# Patient Record
Sex: MALE | Race: BLACK OR AFRICAN AMERICAN | Employment: FULL TIME | URBAN - METROPOLITAN AREA
[De-identification: names, ages, dates, MRNs, and addresses within clinical notes are randomized per-mention and may not be internally consistent; named-entity substitution may affect disease eponyms.]

---

## 2020-03-13 NOTE — PROGRESS NOTES
Valarie Dumont  : 1975  Primary: Richelle Iqbal [employer]  Secondary:  [address] at [facility]  [address], [address] [facility],[address], [facility] [address].  [phone]   [phone]         OUTPATIENT OCCUPATIONAL THERAPY: Daily Treatment Note 3/16/2020  Visit Count:  1    ICD-10: Treatment Diagnosis:   Muscle weakness (generalized) (M62.81)  Stiffness of left hand, not elsewhere classified (M25.642)  TREATMENT PLAN:  Effective Dates: 3/16/2020 TO 2020 (90 days). Frequency/Duration: 1-2x/week for 90 Day(s)  Precautions/Allergies:   Patient has no allergy information on record. Pre-treatment Symptoms/Complaints:  Pt states, \"I just have random pain and numbness shooting through my finger. \"   Pain: Initial: Pain Intensity 1: 0 /10 Post Session:  0/10   Medications Last Reviewed:  3/16/2020    Updated Objective Findings:  See evaluation note from today   TREATMENT:     Therapeutic Exercise: (  10 minutes):  Exercises per grid below to improve mobility and strength. Required minimal visual and verbal cues to promote proper body posture and promote proper body mechanics. Progressed resistance, range, repetitions and complexity of movement as indicated. Date:  3/16/2020 Date:   Date:     Activity/Exercise Parameters Parameters Parameters   AROM  X 5 DIP flexion     DIP Blocking Exercises X 5      Resistive Putty Tip pinch x 5 with yellow theraputty. HEP Issued DIP flexion blocking exercises and tip pinch strengthening exercises                             [facility]  Treatment/Session Summary:  Pt issued HEP including tip pinch exercises with yellow theraputty and DIP blocking. Pt encouraged to d/c exercises should there be any noted increase in pain; pt verbalized understanding. · Response to Treatment:  no adverse reaction.   · Communication/Consultation:  OT role and plan of care  · Equipment provided today:  yellow theraputty  · Recommendations/Intent for next treatment session: Next visit will focus on advancement towards more challenging exercises and reduction in the amount of assistance provided. \"    Total Treatment Billable Duration:  10 minutes   OT Patient Time In/Time Out  Time In: 4084  Time Out: [address], OT    Future Appointments   Date Time Provider Eric Hurtado   3/27/2020 11:00 AM Radha Oliveira OT [facility] DIANE   4/3/2020 10:15 AM Radha Oliveira OT DOUG CONTRERAS   4/10/2020 11:00 AM Radha Oliveira OT SFEORPT BHARATHIE   4/17/2020 11:00 AM Radha Oliveira OT BHARATHIEORPCAMILLE GARVINE

## 2020-03-13 NOTE — THERAPY EVALUATION
Francesca Wright  : 1975  Primary: Puja Decker  Secondary:  [address] at [place] [id number] [facility], [address] [place] [address],[address], [facility] [address].  [phone]   [phone]           OUTPATIENT OCCUPATIONAL THERAPY:Initial Assessment 3/16/2020   ICD-10: Treatment Diagnosis:   Muscle weakness (generalized) (M62.81)  Stiffness of left hand, not elsewhere classified (H65.276)  Precautions/Allergies:   Patient has no allergy information on record. TREATMENT PLAN:  Effective Dates: 3/16/2020 TO 2020 (90 days). Frequency/Duration: 1-2x/week for 90 Day(s) MEDICAL/REFERRING DIAGNOSIS:  Displaced fracture of distal phalanx of unspecified finger, initial encounter for open fracture [S62.639B]   DATE OF ONSET:   REFERRING PHYSICIAN: Bradford Braswell MD MD Orders: eval and treat  Return MD Appointment: ~ 2 weeks ago. INITIAL ASSESSMENT:  Mr. Memo Remy presents for displaced fracture of L DIP s/p laceration to DIP in work incident. Upon evaluation, pt presents with decreased DIP flexion AROM as well as limited tip/3JC pinch and  strength. At this time, Francesca Wright would benefit from skilled OT services to maximize functional use of L hand during ADLs, IADLs, and work tasks. PROBLEM LIST (Impacting functional limitations):  1. Decreased Strength  2. Decreased ADL/Functional Activities  3. Increased Pain  4. Decreased Activity Tolerance  5. Decreased Flexibility/Joint Mobility  6. Decreased [place] with Home Exercise Program INTERVENTIONS PLANNED: (Treatment may consist of any combination of the following)  1. Activities of daily living training  2. Adaptive equipment training  3. Donning&doffing training  4. Manual therapy training  5. Modalities  6. Neuromuscular re-eduation  7. Re-evaluation  8. Splinting  9. Therapeutic activity  10.  Therapeutic exercise     GOALS: (Goals have been discussed and agreed upon with patient.)  Short-Term Functional Goals: Time Frame: 45 days  1. Patient will be independent with HEP within 2 visits of initial evaluation in order to maintain gains achieved during therapy. Discharge Goals: Time Frame: 90 days  1. Patient will report increased independence with activities of daily living, evidenced by a score of < 30 on the Quick Dash. 2. Patient will demonstrate improved left  strength by 10 lbs to increase independence with completing heavy household chores. 3. Patient will improve left DIP flexion AROM by 20 degrees for increased ability to complete feeding tasks. 4. Patient will improve L tip pinch by 5 degrees to for increased ability to open tight or new jars. OUTCOME MEASURE:   Tool Used: Disabilities of the Arm, Shoulder and Hand (DASH) Questionnaire - Quick Version  Score:  Initial: 46/55  Most Recent: X/55 (Date: -- )   Interpretation of Score: The DASH is designed to measure the activities of daily living in person's with upper extremity dysfunction or pain. Each section is scored on a 1-5 scale, 5 representing the greatest disability. The scores of each section are added together for a total score of 55. MEDICAL NECESSITY:   · Patient demonstrates good rehab potential due to higher previous functional level. REASON FOR SERVICES/OTHER COMMENTS:  · Patient continues to require skilled intervention due to medical complications and patient unable to attend/participate in therapy as expected. Total Duration:  OT Patient Time In/Time Out  Time In: 1025  Time Out: 1105    Rehabilitation Potential For Stated Goals: Good  Regarding Car Montoya's therapy, I certify that the treatment plan above will be carried out by a therapist or under their direction. Thank you for this referral,  Fidelina Chavez, OT     Referring Physician Signature: Raymundo Babcock MD No Signature is Required for this note.      PAIN/SUBJECTIVE:   Initial: Pain Intensity 1: 0  /10 Post Session:  0/10   OCCUPATIONAL PROFILE & HISTORY: History of Injury/Illness (Reason for Referral): Work incident resulting in deep laceration to the bone of his L index DIP resulting in limited DIP flexion as well as decreased  and pinch strength. Reports random occurrence of numbness, pain and swelling. Notes pain to be 10/10 with activity. Past Medical History/Comorbidities:   Mr. Bk Cade  has no past medical history on file. Mr. Bk Cade  has no past surgical history on file. Social History/Living Environment:      Prior Level of Function/Work/Activity:  Independent with ADLs, IADLs, and work tasks. Dominant Side:         RIGHT  Personal Factors:          Sex:  male        Age:  45 y.o. Ambulatory/Rehab Services H2 Model Falls Risk Assessment   Risk Factors:       (1)  Gender [Male] Ability to Rise from Chair:       (0)  Ability to rise in a single movement   Falls Prevention Plan:       No modifications necessary   Total: (5 or greater = High Risk): 0   ©2010 [facility] [employer]. All Rights Reserved. [facility] Patent #[id number]. Federal Law prohibits the replication, distribution or use without written permission from [facility] [employer]   Current Medications:     No current outpatient medications on file.    Date Last Reviewed:  3/16/2020     Complexity Level: Brief history (0):  LOW COMPLEXITY   ASSESSMENT OF OCCUPATIONAL PERFORMANCE:   ROM:    L MP: WFL  L PIP flexion: 73 degrees  L DIP flexion: 0 degrees    R DIP flexion: 75 degrees    Strength:    L : 40, 30, 40  R : 77, 80, 85    L tip: 1 lbs   L 3JC: 3 lbs    R tip: 12  R 3JC: 14  Activities of Daily Living:           Basic ADLs (From Assessment) Complex ADLs (From Assessment)   Basic ADL  Feeding: Modified independent  Oral Facial Hygiene/Grooming: Modified Independent  Bathing: Modified independent  Upper Body Dressing: Modified independent  Lower Body Dressing: Modified independent  Toileting: Modified independent Instrumental ADL  Meal Preparation: Modified independent  Homemaking: Modified independent   Grooming/Bathing/Dressing Activities of Daily Living                                    Physical Skills Involved:  1. Range of Motion  2. Strength  3. Activity Tolerance  4. Fine Motor Control Cognitive Skills Affected (resulting in the inability to perform in a timely and safe manner): 1. none Psychosocial Skills Affected:  1. Habits/Routines  2. Environmental Adaptation   Number of elements that affect the Plan of Care[de-identified] 5+:  HIGH COMPLEXITY   CLINICAL DECISION MAKING:   Clinical Decision-Making Assessment: This case is of low clinical decision making due to the pt's ROM, strength and pain being the main limiting factors to occupational performance.      Assessment process, impact of co-morbidities, assessment modification\need for assistance, and selection of interventions: Analytical Complexity:LOW COMPLEXITY

## 2020-03-16 ENCOUNTER — HOSPITAL ENCOUNTER (OUTPATIENT)
Dept: PHYSICAL THERAPY | Age: 45
Discharge: HOME | End: 2020-03-16
Payer: COMMERCIAL

## 2020-03-16 PROCEDURE — 97165 OT EVAL LOW COMPLEX 30 MIN: CPT

## 2020-03-16 PROCEDURE — 97110 THERAPEUTIC EXERCISES: CPT

## 2020-06-08 ENCOUNTER — HOSPITAL ENCOUNTER (OUTPATIENT)
Dept: PHYSICAL THERAPY | Age: 45
Discharge: HOME | End: 2020-06-08
Payer: COMMERCIAL

## 2020-06-08 PROCEDURE — 97110 THERAPEUTIC EXERCISES: CPT

## 2020-06-08 NOTE — PROGRESS NOTES
Anant Costa  : 1975  Primary: Mauricio Kelley [employer]  Secondary:  [address] at [place] [address] [facility], [address] [place] [address],[address], [address] [facility]  [phone]   [phone]         OUTPATIENT OCCUPATIONAL THERAPY: Daily Treatment Note 2020  Visit Count:  2    ICD-10: Treatment Diagnosis:   Muscle weakness (generalized) (M62.81)  Stiffness of left hand, not elsewhere classified (M25.642)  TREATMENT PLAN:  Effective Dates: 3/16/2020 TO 2020 (90 days). Frequency/Duration: 1-2x/week for 90 Day(s)  Precautions/Allergies:   Patient has no allergy information on record. Pre-treatment Symptoms/Complaints:  Pt states, \"I have been doing the exercises that Robert De Souza gave me when I came in before you closed for COVID. \"   Pain: Initial: Pain Intensity 1: 6  Pain Location 1: Finger (comment which one)(left index)  Pain Orientation 1: Distal /10 Post Session:  0/10   Medications Last Reviewed:  2020    Updated Objective Findings:  See evaluation note from today   TREATMENT:     Therapeutic Exercise: (  45 minutes):  Exercises per grid below to improve mobility and strength. Required minimal visual and verbal cues to promote proper body posture and promote proper body mechanics. Progressed resistance, range, repetitions and complexity of movement as indicated. Date:  3/16/2020 Date:  20 Date:     Activity/Exercise Parameters Parameters Parameters   AROM  X 5 DIP flexion Full fist 2 x 10  Hook fist 2 x 10    In fluidotherapy x 15 minutes    DIP Blocking Exercises X 5  2 x 10    Resistive Putty Tip pinch x 5 with yellow theraputty.      HEP Issued DIP flexion blocking exercises and tip pinch strengthening exercises     Hand helper  2 x 10  20 pounds      Resistive clothespin  Red x 30 sponge pieces    Digiflex  Index only  Yellow   2 x 10    PROM  2 x 10 with hold at end range of DIP flexion                            [employer] Portal  Treatment/Session Summary:  Pt tolerating therapeutic exercise without complaint; demonstrated independence with HEP since plan of care was interrupted by COVID-19 pandemic. · Response to Treatment:  no adverse reaction. · Communication/Consultation:  OT role and plan of care  · Equipment provided today:  None today  · Recommendations/Intent for next treatment session: Next visit will focus on advancement towards more challenging exercises and reduction in the amount of assistance provided. \"    Total Treatment Billable Duration:  45 minutes   OT Patient Time In/Time Out  Time In: 2770  Time Out: [address] [facility],     Future Appointments   Date Time Provider Eric Hurtado   6/15/2020  1:00 PM Ryne Morin OT [facility] DIANE   6/22/2020  2:30 PM JESSICA Green IRIS   6/29/2020  1:00 PM Ryne Morin OT [facility] DIANE

## 2020-06-15 ENCOUNTER — HOSPITAL ENCOUNTER (OUTPATIENT)
Dept: PHYSICAL THERAPY | Age: 45
Discharge: HOME | End: 2020-06-15
Payer: COMMERCIAL

## 2020-06-15 NOTE — PROGRESS NOTES
Hugo Pierce  : 1975  Primary: Echo Garcia [employer]  Secondary:  [address]  at [place] [id number] [facility], [address] [place] [address],[address], [facility] [address]  [phone]   [phone]       Patient cancelled his appointment today due to a family emergency. Will continue with plan of care at next visit.      Carmeal Fernando OT

## 2020-06-17 ENCOUNTER — HOSPITAL ENCOUNTER (OUTPATIENT)
Dept: PHYSICAL THERAPY | Age: 45
Discharge: HOME | End: 2020-06-17
Payer: COMMERCIAL

## 2020-06-17 PROCEDURE — 97110 THERAPEUTIC EXERCISES: CPT

## 2020-06-17 NOTE — THERAPY RECERTIFICATION
Husam Nicholas : 1975 Primary: Erika Levels Secondary:  [facility]rapy [facility] at [address], [address], [place] [address]. [phone]   [phone] OUTPATIENT OCCUPATIONAL THERAPY:Recertification  ICD-10: Treatment Diagnosis: Muscle weakness (generalized) (M62.81) Stiffness of left hand, not elsewhere classified (M25.642) Precautions/Allergies:  
Patient has no allergy information on record. TREATMENT PLAN: 
Effective Dates:  2020 to 2020. Frequency/Duration: 1-2x/week for 90 Day(s) MEDICAL/REFERRING DIAGNOSIS: 
Displaced fracture of distal phalanx of unspecified finger, initial encounter for open fracture [S62.639B] DATE OF ONSET:  REFERRING PHYSICIAN: Anabell Bobby MD MD Orders: eval and treat Return MD Appointment: 20.  
20: Mr. Montoya's care was interrupted by the [place] pandemic. He has completed 3 occupational therapy visits and is making good progress toward his goals. His DIP range of motion has improved significantly since evaluation. He continues to complain of sharp, shooting intermittent pain in the left index finger. Noted shaking of hand during therapeutic exercise. Feel he may continue to benefit from skilled occupational therapy to maximize functional use of left hand in activities of daily living. INITIAL ASSESSMENT:  Mr. Leo Cedeno presents for displaced fracture of L DIP s/p laceration to DIP in work incident. Upon evaluation, pt presents with decreased DIP flexion AROM as well as limited tip/3JC pinch and  strength. At this time, Husam Hirramya would benefit from skilled OT services to maximize functional use of L hand during ADLs, IADLs, and work tasks. PROBLEM LIST (Impacting functional limitations): 1. Decreased Strength 2. Decreased ADL/Functional Activities 3. Increased Pain 4. Decreased Activity Tolerance 5. Decreased Flexibility/Joint Mobility 6. Decreased [place] with Home Exercise Program INTERVENTIONS PLANNED: (Treatment may consist of any combination of the following) 1. Activities of daily living training 2. Adaptive equipment training 3. Donning&doffing training 4. Manual therapy training 5. Modalities 6. Neuromuscular re-eduation 7. Re-evaluation 8. Splinting 9. Therapeutic activity 10. Therapeutic exercise GOALS: (Goals have been discussed and agreed upon with patient.) Short-Term Functional Goals: Time Frame: 45 days 1. Patient will be independent with HEP within 2 visits of initial evaluation in order to maintain gains achieved during therapy. Met Discharge Goals: Time Frame: 90 days 1. Patient will report increased independence with activities of daily living, evidenced by a score of < 30 on the Quick Dash. Continue to address 2. Patient will demonstrate improved left  strength by 10 lbs to increase independence with completing heavy household chores. 3. Patient will improve left DIP flexion AROM by 20 degrees for increased ability to complete feeding tasks. 4. Patient will improve L tip pinch by 5 degrees to for increased ability to open tight or new jars. OUTCOME MEASURE:  
Tool Used: Disabilities of the Arm, Shoulder and Hand (DASH) Questionnaire - Quick Version Score:  Initial: 46/55  Most Recent: X/55 (Date: -- ) Interpretation of Score: The DASH is designed to measure the activities of daily living in person's with upper extremity dysfunction or pain. Each section is scored on a 1-5 scale, 5 representing the greatest disability. The scores of each section are added together for a total score of 55. MEDICAL NECESSITY:  
· Patient demonstrates good rehab potential due to higher previous functional level. REASON FOR SERVICES/OTHER COMMENTS: 
· Patient continues to require skilled intervention due to medical complications and patient unable to attend/participate in therapy as expected. Total Duration: OT Patient Time In/Time Out Time In: 9982 Time Out: 1105 Rehabilitation Potential For Stated Goals: Good Regarding Lenore Montoya's therapy, I certify that the treatment plan above will be carried out by a therapist or under their direction. Thank you for this referral, 
Kala Diane, JESSICA Referring Physician Signature: Ankit Dejesus MD _______________________________ Date _____________ PAIN/SUBJECTIVE:  
Initial: Pain Intensity 1: 7 Pain Location 1: Finger (comment which one)(index left) Pain Orientation 1: Distal  /10 Post Session:  0/10 OCCUPATIONAL PROFILE & HISTORY:  
History of Injury/Illness (Reason for Referral): Work incident resulting in deep laceration to the bone of his L index DIP resulting in limited DIP flexion as well as decreased  and pinch strength. Reports random occurrence of numbness, pain and swelling. Notes pain to be 10/10 with activity. Past Medical History/Comorbidities:  
Mr. Latanya Meléndez  has no past medical history on file. Mr. Latanya Meléndez  has no past surgical history on file. Social History/Living Environment:  
  
Prior Level of Function/Work/Activity: 
Independent with ADLs, IADLs, and work tasks. Dominant Side:  
      RIGHT Personal Factors:   
      Sex:  male Age:  39 y.o. Ambulatory/Rehab Services H2 Model Falls Risk Assessment Risk Factors: 
     (1)  Gender [Male] Ability to Rise from Chair: 
     (0)  Ability to rise in a single movement Falls Prevention Plan: No modifications necessary Total: (5 or greater = High Risk): 0  
©2010 [facility] [place] [address] [place] [address] States Patent #[id number]. Federal Law prohibits the replication, distribution or use without written permission from [facility] [employer] Current Medications: No current outpatient medications on file. Date Last Reviewed:  6/17/2020 Complexity Level: Brief history (0):  LOW COMPLEXITY ASSESSMENT OF OCCUPATIONAL PERFORMANCE:  
ROM:   
L MP: WFL 
L PIP flexion: 73 degrees; 6/17/20=75 degrees L DIP flexion: 0 degrees' 6/17/20=45 degrees R DIP flexion: 75 degrees Strength:   
L : 40, 30, 40; 6/17/20=41 pounds R : 77, 80, 85; 6/17/20=119 pounds L tip: 1 lbs;  6/17/20=7 L 3JC: 3 lbs;  6/17/20=12 R tip: 12;  6/17/20=13 R 3JC: 14;  6/17/20=22 Activities of Daily Living:  
       
Basic ADLs (From Assessment) Complex ADLs (From Assessment) Grooming/Bathing/Dressing Activities of Daily Living

## 2020-06-17 NOTE — PROGRESS NOTES
Prasanna Paiz  : 1975  Primary: Jodie Loera [employer]  Secondary:  [address] at [address], [address] [place] [address],[address], [facility] [facility]  [phone]   [phone]         OUTPATIENT OCCUPATIONAL THERAPY: Daily Treatment Note 2020  Visit Count:  3    ICD-10: Treatment Diagnosis:   Muscle weakness (generalized) (M62.81)  Stiffness of left hand, not elsewhere classified (M25.642)  TREATMENT PLAN:  Effective Dates: 3/16/2020 TO 2020 (90 days). Frequency/Duration: 1-2x/week for 90 Day(s)  Precautions/Allergies:   Patient has no allergy information on record. Pre-treatment Symptoms/Complaints:  Pt states, \"I keep doing my exercises, but those sharp pains are still really bad. \"   Pain: Initial: Pain Intensity 1: 7  Pain Location 1: Finger (comment which one)(index left)  Pain Orientation 1: Distal /10 Post Session:  0/10   Medications Last Reviewed:  2020    Updated Objective Findings:  See recert note from today   TREATMENT:     Therapeutic Exercise: (  40 minutes):  Exercises per grid below to improve mobility and strength. Required minimal visual and verbal cues to promote proper body posture and promote proper body mechanics. Progressed resistance, range, repetitions and complexity of movement as indicated. Date:  3/16/2020 Date:  20 Date:  20   Activity/Exercise Parameters Parameters Parameters   AROM  X 5 DIP flexion Full fist 2 x 10  Hook fist 2 x 10    In fluidotherapy x 15 minutes Full fist 2 x 10  Hook fist 2 x 10    In fluidotherapy x 15 minutes   DIP Blocking Exercises X 5  2 x 10 2 x 10   Resistive Putty Tip pinch x 5 with yellow theraputty.      HEP Issued DIP flexion blocking exercises and tip pinch strengthening exercises     Hand helper  2 x 10  20 pounds 2 x 10  25 pounds     Resistive clothespin  Red x 30 sponge pieces Green x 30 sponge pieces   Digiflex  Index only  Yellow   2 x 10 Index only  Yellow   2 x 10   PROM  2 x 10 with hold at end range of DIP flexion 2 x 10 with hold at end range of DIP flexion                           [employer] Portal  Treatment/Session Summary:  Pt tolerating therapeutic exercise without complaint; able to tolerate increased resistance. Ice massage x 3 minutes at end of session to decrease pain after activity. · Response to Treatment:  no adverse reaction. · Communication/Consultation:  OT role and plan of care  · Equipment provided today:  None today  · Recommendations/Intent for next treatment session: Next visit will focus on advancement towards more challenging exercises and reduction in the amount of assistance provided. \"    Total Treatment Billable Duration:  40 minutes   OT Patient Time In/Time Out  Time In: 6153  Time Out: [address] [place] [address] [address], OT    Future Appointments   Date Time Provider Eric Hurtado   6/22/2020  2:30 PM Meghna Menjivar OT [facility] DIANE   6/29/2020  1:00 PM Meghna Menjivar OT [facility] BHARATHIE

## 2020-06-22 ENCOUNTER — HOSPITAL ENCOUNTER (OUTPATIENT)
Dept: PHYSICAL THERAPY | Age: 45
Discharge: HOME | End: 2020-06-22
Payer: COMMERCIAL

## 2020-06-22 PROCEDURE — 97140 MANUAL THERAPY 1/> REGIONS: CPT

## 2020-06-22 PROCEDURE — 97110 THERAPEUTIC EXERCISES: CPT

## 2020-06-22 NOTE — PROGRESS NOTES
Morales Yostgilda  : 1975  Primary: [facility]  Secondary:  [address] at [address] [facility]  [address], [address], [address] [facility]  [phone]   [phone]         OUTPATIENT OCCUPATIONAL THERAPY: Daily Treatment Note 2020  Visit Count:  4    ICD-10: Treatment Diagnosis:   Muscle weakness (generalized) (M62.81)  Stiffness of left hand, not elsewhere classified (T49.414)  Precautions/Allergies:   Patient has no allergy information on record. TREATMENT PLAN:  Effective Dates:  2020 to 2020. Frequency/Duration: 1-2x/week for 90 Day(s)    Pre-treatment Symptoms/Complaints:  Pt states, \"I saw the doctor, and he wants me to stay out of work until .\"   Pain: Initial: Pain Intensity 1: 8  Pain Location 1: Finger (comment which one)(Left index)  Pain Orientation 1: Distal /10 Post Session:  5/10   Medications Last Reviewed:  2020    Updated Objective Findings:  None Today   TREATMENT:     Therapeutic Exercise: (  45 minutes):  Exercises per grid below to improve mobility and strength. Required minimal visual and verbal cues to promote proper body posture and promote proper body mechanics. Progressed resistance, range, repetitions and complexity of movement as indicated. Date:  3/16/2020 Date:  20 Date:  20 Date:  20   Activity/Exercise Parameters Parameters Parameters    AROM  X 5 DIP flexion Full fist 2 x 10  Hook fist 2 x 10    In fluidotherapy x 15 minutes Full fist 2 x 10  Hook fist 2 x 10    In fluidotherapy x 15 minutes Full fist 2 x 10  Hook fist 2 x 10  Flat fist 2 x 10    In fluidotherapy x 15 minutes   DIP Blocking Exercises X 5  2 x 10 2 x 10 1 x 10   Resistive Putty Tip pinch x 5 with yellow theraputty.       HEP Issued DIP flexion blocking exercises and tip pinch strengthening exercises      Hand helper  2 x 10  20 pounds 2 x 10  25 pounds 2 x 15  25 pounds     Resistive clothespin  Red x 30 sponge pieces Green x 30 sponge pieces Green x 30 sponge pieces   Digiflex  Index only  Yellow   2 x 10 Index only  Yellow   2 x 10 Index only and full fist  Yellow   2 x 10 each   PROM  2 x 10 with hold at end range of DIP flexion 2 x 10 with hold at end range of DIP flexion 2 x 10 with hold at end range of DIP flexion                            Manual Therapy: (10 minutes): Soft tissue mobilization was utilized and necessary because of the patient's restricted motion of soft tissue. Retrograde massage of left index finger, hand and forearm to decrease edema and increase functional range of motion. Treatment/Session Summary:  Pt tolerating therapeutic exercise without complaint. Pain went from 8/10 upon arrival to 5/10 at end of session. · Response to Treatment:  no adverse reaction. · Communication/Consultation:  OT role and plan of care  · Equipment provided today:  None today  · Recommendations/Intent for next treatment session: Next visit will focus on advancement towards more challenging exercises and reduction in the amount of assistance provided. \"    Total Treatment Billable Duration:  55 minutes   OT Patient Time In/Time Out  Time In: 1440  Time Out: Raisa Lane, OT    Future Appointments   Date Time Provider Eric Hurtado   6/29/2020  1:00 PM Brooklynn Cooper OT [facility]

## 2020-06-29 ENCOUNTER — HOSPITAL ENCOUNTER (OUTPATIENT)
Dept: PHYSICAL THERAPY | Age: 45
Discharge: HOME | End: 2020-06-29
Payer: COMMERCIAL

## 2020-06-29 PROCEDURE — 97110 THERAPEUTIC EXERCISES: CPT

## 2020-06-29 PROCEDURE — 97140 MANUAL THERAPY 1/> REGIONS: CPT

## 2020-06-29 NOTE — PROGRESS NOTES
Henrik Prajapati  : 1975  Primary: Cyn Bower  Secondary:  [address]  at [place] [id number] [facility], [address] [place] [address],[address], [place] [address].  [phone]   [phone]         OUTPATIENT OCCUPATIONAL THERAPY: Daily Treatment Note 2020  Visit Count:  5    ICD-10: Treatment Diagnosis:   Muscle weakness (generalized) (M62.81)  Stiffness of left hand, not elsewhere classified (L22.129)  Precautions/Allergies:   Patient has no allergy information on record. TREATMENT PLAN:  Effective Dates:  2020 to 2020. Frequency/Duration: 1-2x/week for 90 Day(s)    Pre-treatment Symptoms/Complaints:  Pt states, \"My finger is definitely feeling better than it was last week. \"   Pain: Initial: Pain Intensity 1: 5  Pain Location 1: Finger (comment which one)(Left index)  Pain Orientation 1: Distal /10 Post Session:  4/10   Medications Last Reviewed:  2020    Updated Objective Findings:  None Today   TREATMENT:     Therapeutic Exercise: (  35 minutes):  Exercises per grid below to improve mobility and strength. Required minimal visual and verbal cues to promote proper body posture and promote proper body mechanics. Progressed resistance, range, repetitions and complexity of movement as indicated. Date:  3/16/2020 Date:  20 Date:  20 Date:  20 Date:  20   Activity/Exercise Parameters Parameters Parameters     AROM  X 5 DIP flexion Full fist 2 x 10  Hook fist 2 x 10    In fluidotherapy x 15 minutes Full fist 2 x 10  Hook fist 2 x 10    In fluidotherapy x 15 minutes Full fist 2 x 10  Hook fist 2 x 10  Flat fist 2 x 10    In fluidotherapy x 15 minutes Full fist 2 x 10  Hook fist 2 x 10  Flat fist 1 x 10    In fluidotherapy x 15 minutes   DIP Blocking Exercises X 5  2 x 10 2 x 10 1 x 10    Resistive Putty Tip pinch x 5 with yellow theraputty.        HEP Issued DIP flexion blocking exercises and tip pinch strengthening exercises       Hand helper  2 x 10  20 pounds 2 x 10  25 pounds 2 x 15  25 pounds 2 x 10  25 pounds     Resistive clothespin  Red x 30 sponge pieces Green x 30 sponge pieces Green x 30 sponge pieces Blue x 30 sponge pieces   Digiflex  Index only  Yellow   2 x 10 Index only  Yellow   2 x 10 Index only and full fist  Yellow   2 x 10 each Index only and full fist  Yellow   2 x 10 each   PROM  2 x 10 with hold at end range of DIP flexion 2 x 10 with hold at end range of DIP flexion 2 x 10 with hold at end range of DIP flexion 1 x 15 with hold at end range of DIP flexion                               Manual Therapy: (10 minutes): Soft tissue mobilization was utilized and necessary because of the patient's restricted motion of soft tissue. Retrograde massage of left index finger, hand and forearm to decrease edema and increase functional range of motion. Treatment/Session Summary:  Pt tolerating gentle progressive range of motion and strengthening without complaint. Pain went from 5/10 upon arrival to 4/10 at end of session. Pain report overall since last session improved from 8 to 5/10. · Response to Treatment:  no adverse reaction. · Communication/Consultation:  OT role and plan of care  · Equipment provided today:  None today  · Recommendations/Intent for next treatment session: Next visit will focus on advancement towards more challenging exercises and reduction in the amount of assistance provided. \"    Total Treatment Billable Duration:  45 minutes   OT Patient Time In/Time Out  Time In: 1300  Time Out: [place], OT    Future Appointments   Date Time Provider Eric Hurtado   7/13/2020  1:00 PM Sugar Run Fitting, OT [facility]   7/20/2020  1:00 PM [place] Fitting, OT [facility]   7/27/2020  1:00 PM [place] Fitting, OT [facility]

## 2020-07-13 ENCOUNTER — HOSPITAL ENCOUNTER (OUTPATIENT)
Dept: PHYSICAL THERAPY | Age: 45
Discharge: HOME | End: 2020-07-13
Payer: COMMERCIAL

## 2020-07-13 PROCEDURE — 97140 MANUAL THERAPY 1/> REGIONS: CPT

## 2020-07-13 PROCEDURE — 97110 THERAPEUTIC EXERCISES: CPT

## 2020-07-13 NOTE — PROGRESS NOTES
Henrik Prajapati  : 1975  Primary: Cyn Chavezkamilah  Secondary:  [address]  at [address], [address] [place] [address],[address], [place] [address].  [phone]   [phone]         OUTPATIENT OCCUPATIONAL THERAPY: Daily Treatment Note 2020  Visit Count:  6    ICD-10: Treatment Diagnosis:   Muscle weakness (generalized) (M62.81)  Stiffness of left hand, not elsewhere classified (R98.652)  Precautions/Allergies:   Patient has no allergy information on record. TREATMENT PLAN:  Effective Dates:  2020 to 2020. Frequency/Duration: 1-2x/week for 90 Day(s)    Pre-treatment Symptoms/Complaints:  Pt states, \"My pain is a little better. \"   Pain: Initial: Pain Intensity 1: 4  Pain Location 1: Finger (comment which one)(left index)  Pain Orientation 1: Distal /10 Post Session:  2/10   Medications Last Reviewed:  2020    Updated Objective Findings:  None Today   TREATMENT:     Therapeutic Exercise: (  35 minutes):  Exercises per grid below to improve mobility and strength. Required minimal visual and verbal cues to promote proper body posture and promote proper body mechanics. Progressed resistance, range, repetitions and complexity of movement as indicated. Date:  3/16/2020 Date:  20 Date:  20 Date:  20 Date:  20   Activity/Exercise Parameters Parameters Parameters      AROM  X 5 DIP flexion Full fist 2 x 10  Hook fist 2 x 10    In fluidotherapy x 15 minutes Full fist 2 x 10  Hook fist 2 x 10    In fluidotherapy x 15 minutes Full fist 2 x 10  Hook fist 2 x 10  Flat fist 2 x 10    In fluidotherapy x 15 minutes Full fist 2 x 10  Hook fist 2 x 10  Flat fist 1 x 10    In fluidotherapy x 15 minutes Full fist 2 x 10  Hook fist 2 x 10  Flat fist 1 x 10    In fluidotherapy x 15 minutes   DIP Blocking Exercises X 5  2 x 10 2 x 10 1 x 10  1 x 10   Resistive Putty Tip pinch x 5 with yellow theraputty.         HEP Issued DIP flexion blocking exercises and tip pinch strengthening exercises        Hand helper  2 x 10  20 pounds 2 x 10  25 pounds 2 x 15  25 pounds 2 x 10  25 pounds 2 x 10  25 pounds     Resistive clothespin  Red x 30 sponge pieces Green x 30 sponge pieces Green x 30 sponge pieces Blue x 30 sponge pieces Blue x 30 sponge pieces   Digiflex  Index only  Yellow   2 x 10 Index only  Yellow   2 x 10 Index only and full fist  Yellow   2 x 10 each Index only and full fist  Yellow   2 x 10 each Index only and full fist  Yellow   2 x 10 each   PROM  2 x 10 with hold at end range of DIP flexion 2 x 10 with hold at end range of DIP flexion 2 x 10 with hold at end range of DIP flexion 1 x 15 with hold at end range of DIP flexion 1 x 15 with hold at end range of DIP flexion                                  Manual Therapy: (10 minutes): Soft tissue mobilization was utilized and necessary because of the patient's restricted motion of soft tissue. Retrograde massage of left index finger, hand and forearm to decrease edema and increase functional range of motion. Treatment/Session Summary:  Pt tolerating gentle progressive range of motion and strengthening without complaint. Pain went from 4/10 upon arrival to 2/10 at end of session. Pain report overall since last session improved from 5 to 4/10. · Response to Treatment:  no adverse reaction. · Communication/Consultation:  OT role and plan of care  · Equipment provided today:  None today  · Recommendations/Intent for next treatment session: Next visit will focus on advancement towards more challenging exercises and reduction in the amount of assistance provided. \"    Total Treatment Billable Duration:  45 minutes   OT Patient Time In/Time Out  Time In: 1300  Time Out: [address], OT    Future Appointments   Date Time Provider Eric Hurtado   7/20/2020  1:00 PM Markus Hager OT [facility]   7/27/2020  1:00 PM Markus Hager OT [facility]

## 2020-07-20 ENCOUNTER — HOSPITAL ENCOUNTER (OUTPATIENT)
Dept: PHYSICAL THERAPY | Age: 45
Discharge: HOME | End: 2020-07-20
Payer: COMMERCIAL

## 2020-07-20 PROCEDURE — 97110 THERAPEUTIC EXERCISES: CPT

## 2020-07-20 NOTE — PROGRESS NOTES
Abilio Margarito  : 1975  Primary: Leticia [facility]  Secondary:  [address] at [place] [id number] [facility], [address] [place] [address],[address], [facility] [address].  [phone]   [phone]         OUTPATIENT OCCUPATIONAL THERAPY: Daily Treatment Note 2020  Visit Count:  7    ICD-10: Treatment Diagnosis:   Muscle weakness (generalized) (M62.81)  Stiffness of left hand, not elsewhere classified (I16.751)  Precautions/Allergies:   Patient has no allergy information on record. TREATMENT PLAN:  Effective Dates:  2020 to 2020. Frequency/Duration: 1-2x/week for 90 Day(s)    Pre-treatment Symptoms/Complaints:  Pt states, \"My finger is still sore and numb. I have started back to work a couple of days a week at [employer]. \"   Pain: Initial: Pain Intensity 1: 4  Pain Location 1: Finger (comment which one)(left index)  Pain Orientation 1: Distal /10 Post Session:  2/10   Medications Last Reviewed:  2020    Updated Objective Findings:  None Today   TREATMENT:     Therapeutic Exercise: (  40 minutes):  Exercises per grid below to improve mobility and strength. Required minimal visual and verbal cues to promote proper body posture and promote proper body mechanics. Progressed resistance, range, repetitions and complexity of movement as indicated.      Date:  20 Date:  20 Date:  20 Date:  20 Date:  20   Activity/Exercise Parameters Parameters       AROM  Full fist 2 x 10  Hook fist 2 x 10    In fluidotherapy x 15 minutes Full fist 2 x 10  Hook fist 2 x 10    In fluidotherapy x 15 minutes Full fist 2 x 10  Hook fist 2 x 10  Flat fist 2 x 10    In fluidotherapy x 15 minutes Full fist 2 x 10  Hook fist 2 x 10  Flat fist 1 x 10    In fluidotherapy x 15 minutes Full fist 2 x 10  Hook fist 2 x 10  Flat fist 1 x 10    In fluidotherapy x 15 minutes Full fist 2 x 10  Hook fist 2 x 10  Flat fist 1 x 10    In fluidotherapy x 15 minutes   DIP Blocking Exercises 2 x 10 2 x 10 1 x 10  1 x 10 2 x 10   Resistive Putty         HEP         Hand helper 2 x 10  20 pounds 2 x 10  25 pounds 2 x 15  25 pounds 2 x 10  25 pounds 2 x 10  25 pounds 2 x 10  25 pounds     Resistive clothespin Red x 30 sponge pieces Green x 30 sponge pieces Green x 30 sponge pieces Blue x 30 sponge pieces Blue x 30 sponge pieces Blue x 30 sponge pieces   Digiflex Index only  Yellow   2 x 10 Index only  Yellow   2 x 10 Index only and full fist  Yellow   2 x 10 each Index only and full fist  Yellow   2 x 10 each Index only and full fist  Yellow   2 x 10 each Index only and full fist  Yellow   2 x 10 each   PROM 2 x 10 with hold at end range of DIP flexion 2 x 10 with hold at end range of DIP flexion 2 x 10 with hold at end range of DIP flexion 1 x 15 with hold at end range of DIP flexion 1 x 15 with hold at end range of DIP flexion 2 x 10 with hold at end range of DIP flexion                                  Manual Therapy: (5 minutes): Soft tissue mobilization was utilized and necessary because of the patient's restricted motion of soft tissue. Retrograde massage of left index finger, hand and forearm to decrease edema and increase functional range of motion. Treatment/Session Summary:  Pt tolerating gentle progressive range of motion and strengthening. Pain went from 4/10 upon arrival to 2/10 at end of session. Pain report overall since last session stayed a 4/10. · Response to Treatment:  no adverse reaction. · Communication/Consultation:  OT role and plan of care  · Equipment provided today:  None today  · Recommendations/Intent for next treatment session: Next visit will focus on advancement towards more challenging exercises and reduction in the amount of assistance provided. \"    Total Treatment Billable Duration:  45 minutes   OT Patient Time In/Time Out  Time In: 1300  Time Out: [place], OT    Future Appointments   Date Time Provider Eric Hurtado   7/27/2020  1:00 PM Joe Estimable D, OT [facility]

## 2020-07-27 ENCOUNTER — HOSPITAL ENCOUNTER (OUTPATIENT)
Dept: PHYSICAL THERAPY | Age: 45
Discharge: HOME | End: 2020-07-27
Payer: COMMERCIAL

## 2020-07-27 NOTE — PROGRESS NOTES
Tawanna Sanchez  : 1975  Primary: Marina O[facility]  Secondary:  [address]  at [place] [id number] [facility], [address] [place] [address],[address], [place] [address].  [phone]   [phone]       Patient cancelled today's appointment due to illness.      Seth Garcia, OT

## 2020-08-04 ENCOUNTER — APPOINTMENT (OUTPATIENT)
Dept: PHYSICAL THERAPY | Age: 45
End: 2020-08-04
Payer: OTHER MISCELLANEOUS

## 2020-08-10 ENCOUNTER — HOSPITAL ENCOUNTER (OUTPATIENT)
Dept: PHYSICAL THERAPY | Age: 45
Discharge: HOME | End: 2020-08-10
Payer: OTHER MISCELLANEOUS

## 2020-08-10 PROCEDURE — 97110 THERAPEUTIC EXERCISES: CPT

## 2020-08-10 PROCEDURE — 97140 MANUAL THERAPY 1/> REGIONS: CPT

## 2020-08-10 NOTE — PROGRESS NOTES
Hugo Pelayo  : 1975  Primary: Echo Radha [facility]  Secondary:  [address] at [address] [facility]  [address], [address], [address] [facility]  [phone]   [phone]         OUTPATIENT OCCUPATIONAL THERAPY: Daily Treatment Note 8/10/2020  Visit Count:  8    ICD-10: Treatment Diagnosis:   Muscle weakness (generalized) (M62.81)  Stiffness of left hand, not elsewhere classified (H98.902)  Precautions/Allergies:   Patient has no allergy information on record. TREATMENT PLAN:  Effective Dates:  2020 to 2020. Frequency/Duration: 1-2x/week for 90 Day(s)    Pre-treatment Symptoms/Complaints:  Pt states, \"I am doing 4 hour shifts at [employer]. \"   Pain: Initial: Pain Intensity 1: 4  Pain Location 1: Finger (comment which one)(Left index)  Pain Orientation 1: Distal /10 Post Session:  2/10   Medications Last Reviewed:  8/10/2020    Updated Objective Findings:  None Today   TREATMENT:     Therapeutic Exercise: (  35 minutes):  Exercises per grid below to improve mobility and strength. Required minimal visual and verbal cues to promote proper body posture and promote proper body mechanics. Progressed resistance, range, repetitions and complexity of movement as indicated.      Date:  20 Date:  20 Date:  20 Date:  20 Date:  8/10/20   Activity/Exercise Parameters        AROM  Full fist 2 x 10  Hook fist 2 x 10    In fluidotherapy x 15 minutes Full fist 2 x 10  Hook fist 2 x 10  Flat fist 2 x 10    In fluidotherapy x 15 minutes Full fist 2 x 10  Hook fist 2 x 10  Flat fist 1 x 10    In fluidotherapy x 15 minutes Full fist 2 x 10  Hook fist 2 x 10  Flat fist 1 x 10    In fluidotherapy x 15 minutes Full fist 2 x 10  Hook fist 2 x 10  Flat fist 1 x 10    In fluidotherapy x 15 minutes Full fist 2 x 10  Hook fist 2 x 10  Flat fist 1 x 10    In fluidotherapy x 10 minutes   DIP Blocking Exercises 2 x 10 1 x 10  1 x 10 2 x 10 2 x 10   Resistive Putty         HEP Hand helper 2 x 10  25 pounds 2 x 15  25 pounds 2 x 10  25 pounds 2 x 10  25 pounds 2 x 10  25 pounds 2 x 15  25 pounds     Resistive clothespin Green x 30 sponge pieces Green x 30 sponge pieces Blue x 30 sponge pieces Blue x 30 sponge pieces Blue x 30 sponge pieces Blue x 30 sponge pieces   Digiflex Index only  Yellow   2 x 10 Index only and full fist  Yellow   2 x 10 each Index only and full fist  Yellow   2 x 10 each Index only and full fist  Yellow   2 x 10 each Index only and full fist  Yellow   2 x 10 each Index only   Yellow   2 x 10  and full fist green 2 x 10   PROM 2 x 10 with hold at end range of DIP flexion 2 x 10 with hold at end range of DIP flexion 1 x 15 with hold at end range of DIP flexion 1 x 15 with hold at end range of DIP flexion 2 x 10 with hold at end range of DIP flexion 2 x 10 with hold at end range of DIP flexion                                  Manual Therapy: (10 minutes): Soft tissue mobilization was utilized and necessary because of the patient's restricted motion of soft tissue. Retrograde massage of left index finger, hand and forearm to decrease edema and increase functional range of motion. Treatment/Session Summary:  Pt tolerating gentle progressive range of motion and strengthening. Pain went from 4/10 upon arrival to 2/10 at end of session. Pain report overall since last session stayed a 3-4/10. · Response to Treatment:  no adverse reaction. · Communication/Consultation:  OT role and plan of care  · Equipment provided today:  None today  · Recommendations/Intent for next treatment session: Next visit will focus on advancement towards more challenging exercises and reduction in the amount of assistance provided. \"    Total Treatment Billable Duration:  45 minutes   OT Patient Time In/Time Out  Time In: 1310  Time Out: [address] [facility], OT    Future Appointments   Date Time Provider Eric Genesis   8/17/2020  1:45 PM Yesenia Gunderson OT [facility] 8/24/2020  1:45 PM JESSICA Connor   8/31/2020  1:45 PM JESSICA Connor

## 2020-08-19 ENCOUNTER — HOSPITAL ENCOUNTER (OUTPATIENT)
Dept: PHYSICAL THERAPY | Age: 45
Discharge: HOME | End: 2020-08-19
Payer: OTHER MISCELLANEOUS

## 2020-08-19 PROCEDURE — 97110 THERAPEUTIC EXERCISES: CPT

## 2020-08-19 PROCEDURE — 97140 MANUAL THERAPY 1/> REGIONS: CPT

## 2020-08-19 NOTE — PROGRESS NOTES
Satish Brittaney  : 1975  Primary: Aaron Dotyrojelio [facility]  Secondary:  [address]  at [place] [address],[address] [facility], [place] [address].  [phone]   [phone]         OUTPATIENT OCCUPATIONAL THERAPY: Daily Treatment Note 2020  Visit Count:  9    ICD-10: Treatment Diagnosis:   Muscle weakness (generalized) (M62.81)  Stiffness of left hand, not elsewhere classified (L71.413)  Precautions/Allergies:   Patient has no allergy information on record. TREATMENT PLAN:  Effective Dates:  2020 to 2020. Frequency/Duration: 1-2x/week for 90 Day(s)    Pre-treatment Symptoms/Complaints:  Pt states, \"I am sorry I missed Monday; I was trying to get my daughter's school figured out. \"   Pain: Initial: Pain Intensity 1: 3  Pain Location 1: Finger (comment which one)(left index)  Pain Orientation 1: Distal /10 Post Session:  2/10   Medications Last Reviewed:  2020    Updated Objective Findings:  None Today   TREATMENT:     Therapeutic Exercise: (  30 minutes):  Exercises per grid below to improve mobility and strength. Required minimal visual and verbal cues to promote proper body posture and promote proper body mechanics. Progressed resistance, range, repetitions and complexity of movement as indicated.      Date:  20 Date:  20 Date:  20 Date:  8/10/20 Date:  20   Activity/Exercise         AROM  Full fist 2 x 10  Hook fist 2 x 10  Flat fist 2 x 10    In fluidotherapy x 15 minutes Full fist 2 x 10  Hook fist 2 x 10  Flat fist 1 x 10    In fluidotherapy x 15 minutes Full fist 2 x 10  Hook fist 2 x 10  Flat fist 1 x 10    In fluidotherapy x 15 minutes Full fist 2 x 10  Hook fist 2 x 10  Flat fist 1 x 10    In fluidotherapy x 15 minutes Full fist 2 x 10  Hook fist 2 x 10  Flat fist 1 x 10    In fluidotherapy x 10 minutes Full fist 2 x 10  Hook fist 2 x 10  Flat fist 1 x 10    In fluidotherapy x 10 minutes   DIP Blocking Exercises 1 x 10  1 x 10 2 x 10 2 x 10 2 x 10   Resistive Putty         HEP         Hand helper 2 x 15  25 pounds 2 x 10  25 pounds 2 x 10  25 pounds 2 x 10  25 pounds 2 x 15  25 pounds 2 x 15  30 pounds     Resistive clothespin Green x 30 sponge pieces Blue x 30 sponge pieces Blue x 30 sponge pieces Blue x 30 sponge pieces Blue x 30 sponge pieces Blue x 30 sponge pieces   Digiflex Index only and full fist  Yellow   2 x 10 each Index only and full fist  Yellow   2 x 10 each Index only and full fist  Yellow   2 x 10 each Index only and full fist  Yellow   2 x 10 each Index only   Yellow   2 x 10  and full fist green 2 x 10 Index only   Yellow   2 x 10  and full fist green 2 x 10   PROM 2 x 10 with hold at end range of DIP flexion 1 x 15 with hold at end range of DIP flexion 1 x 15 with hold at end range of DIP flexion 2 x 10 with hold at end range of DIP flexion 2 x 10 with hold at end range of DIP flexion 2 x 10 with hold at end range of DIP flexion                                  Manual Therapy: (10 minutes): Soft tissue mobilization was utilized and necessary because of the patient's restricted motion of soft tissue. Retrograde massage of left index finger, hand and forearm to decrease edema and increase functional range of motion. Treatment/Session Summary:  Pt tolerating continued gentle progressive range of motion and strengthening. Pain went from 3/10 upon arrival to 2/10 at end of session. Pain report overall since last session stayed a 3/10. Patient also complains of tingling and shaking in left index finger and hand at rest and with use. · Response to Treatment:  no adverse reaction. · Communication/Consultation:  OT role and plan of care  · Equipment provided today:  None today  · Recommendations/Intent for next treatment session: Next visit will focus on advancement towards more challenging exercises and reduction in the amount of assistance provided. \"    Total Treatment Billable Duration:  40 minutes   OT Patient Time In/Time Out  Time In: 0940  Time Out: [address],     Future Appointments   Date Time Provider Eric Hurtado   8/24/2020  1:45 PM Michael Galindo OT [facility]   8/31/2020  1:45 PM Michael Galindo OT [facility]

## 2020-08-24 ENCOUNTER — HOSPITAL ENCOUNTER (OUTPATIENT)
Dept: PHYSICAL THERAPY | Age: 45
Discharge: HOME | End: 2020-08-24
Payer: OTHER MISCELLANEOUS

## 2020-08-24 PROCEDURE — 97110 THERAPEUTIC EXERCISES: CPT

## 2020-08-24 PROCEDURE — 97140 MANUAL THERAPY 1/> REGIONS: CPT

## 2020-08-24 NOTE — PROGRESS NOTES
Sherren Silva  : 1975  Primary: Lev Plant [employer]  Secondary:  [address]  at [facility]  [address], [address], [facility] [address].  [phone]   [phone]         OUTPATIENT OCCUPATIONAL THERAPY: Daily Treatment Note 2020  Visit Count:  10    ICD-10: Treatment Diagnosis:   Muscle weakness (generalized) (M62.81)  Stiffness of left hand, not elsewhere classified (T24.575)  Precautions/Allergies:   Patient has no allergy information on record. TREATMENT PLAN:  Effective Dates:  2020 to 2020. Frequency/Duration: 1-2x/week for 90 Day(s)    Pre-treatment Symptoms/Complaints:  Pt states, \"My pain is about the same. \"   Pain: Initial: Pain Intensity 1: 3  Pain Location 1: Finger (comment which one)(left index)  Pain Orientation 1: Distal /10 Post Session:  2/10   Medications Last Reviewed:  2020    Updated Objective Findings:  see below:   ROM:    L MP: WFL  L PIP flexion: 73 degrees; 20=75 degrees; 20=91  L DIP flexion: 0 degrees' 20=45 degrees; 20=60     R DIP flexion: 75 degrees     Strength:    L : 40, 30, 40; 20=41 pounds; 20=58 pounds  R : 77, 80, 85; 20=119 pounds; 20=119 pounds     L tip: 1 lbs;  20=7; 20=8 pounds  L 3JC: 3 lbs;  20=12; 20=15 pounds     R tip: 12;  20=13; 20=17 pounds  R 3JC: 14;  20=22; 20=19 pounds   TREATMENT:     Therapeutic Exercise: (  30 minutes):  Exercises per grid below to improve mobility and strength. Required minimal visual and verbal cues to promote proper body posture and promote proper body mechanics. Progressed resistance, range, repetitions and complexity of movement as indicated.      Date:  20 Date:  20 Date:  8/10/20 Date:  20 Date:  20   Activity/Exercise         AROM  Full fist 2 x 10  Hook fist 2 x 10  Flat fist 1 x 10    In fluidotherapy x 15 minutes Full fist 2 x 10  Hook fist 2 x 10  Flat fist 1 x 10    In fluidotherapy x 15 minutes Full fist 2 x 10  Hook fist 2 x 10  Flat fist 1 x 10    In fluidotherapy x 15 minutes Full fist 2 x 10  Hook fist 2 x 10  Flat fist 1 x 10    In fluidotherapy x 10 minutes Full fist 2 x 10  Hook fist 2 x 10  Flat fist 1 x 10    In fluidotherapy x 10 minutes In fluidotherapy x 10 minutes   DIP Blocking Exercises  1 x 10 2 x 10 2 x 10 2 x 10 2 x 10   Resistive Putty      Green  Full fist x 15 reps    Pinch x 20 reps   HEP         Hand helper 2 x 10  25 pounds 2 x 10  25 pounds 2 x 10  25 pounds 2 x 15  25 pounds 2 x 15  30 pounds 2 x 15  30 pounds     Resistive clothespin Blue x 30 sponge pieces Blue x 30 sponge pieces Blue x 30 sponge pieces Blue x 30 sponge pieces Blue x 30 sponge pieces Blue x 30 sponge pieces   Digiflex Index only and full fist  Yellow   2 x 10 each Index only and full fist  Yellow   2 x 10 each Index only and full fist  Yellow   2 x 10 each Index only   Yellow   2 x 10  and full fist green 2 x 10 Index only   Yellow   2 x 10  and full fist green 2 x 10 Index only   Yellow   2 x 10  and full fist green 2 x 10   PROM 1 x 15 with hold at end range of DIP flexion 1 x 15 with hold at end range of DIP flexion 2 x 10 with hold at end range of DIP flexion 2 x 10 with hold at end range of DIP flexion 2 x 10 with hold at end range of DIP flexion 2 x 10 with hold at end range of DIP flexion                                  Manual Therapy: (10 minutes): Soft tissue mobilization was utilized and necessary because of the patient's restricted motion of soft tissue. Retrograde massage of left index finger, hand and forearm to decrease edema and increase functional range of motion. Treatment/Session Summary:  Pt tolerating continued gentle progressive range of motion and strengthening; noted improvements in range of motion and  and pinch measurements today. Pain went from 3/10 upon arrival to 2/10 at end of session.  Pain report overall since last session stayed a 3/10. Patient also complains of tingling and shaking in left index finger and hand at rest and with use. · Response to Treatment:  no adverse reaction. · Communication/Consultation:  OT role and plan of care  · Equipment provided today:  Green theraputty  · Recommendations/Intent for next treatment session: Next visit will focus on advancement towards more challenging exercises and reduction in the amount of assistance provided. \"    Total Treatment Billable Duration:  40 minutes   OT Patient Time In/Time Out  Time In: 0706  Time Out: [address] [facility],     Future Appointments   Date Time Provider Eric Hurtado   8/31/2020  1:45 PM Steffanie Isaacs OT [facility]

## 2020-08-31 ENCOUNTER — HOSPITAL ENCOUNTER (OUTPATIENT)
Dept: PHYSICAL THERAPY | Age: 45
Discharge: HOME | End: 2020-08-31
Payer: OTHER MISCELLANEOUS

## 2020-08-31 NOTE — PROGRESS NOTES
Jared Gracia  : 1975  Primary: Kim Deckerriskamilah  Secondary:  [address]  at [place] [id number] [facility], [address] [place] [address],[address], [facility] 91.  [phone]   [phone]       Mr. Nazanin Cadet cancelled today's appointment and rescheduled for Wednesday.      Dl Hidalgo, OT

## 2020-09-02 ENCOUNTER — HOSPITAL ENCOUNTER (OUTPATIENT)
Dept: PHYSICAL THERAPY | Age: 45
Discharge: HOME | End: 2020-09-02
Payer: COMMERCIAL

## 2020-09-02 PROCEDURE — 97110 THERAPEUTIC EXERCISES: CPT

## 2020-09-02 PROCEDURE — 97140 MANUAL THERAPY 1/> REGIONS: CPT

## 2020-09-02 NOTE — PROGRESS NOTES
Dina Amador  : 1975  Primary: Edmond Bower  Secondary:  [address]  at [address] [facility]  [facility], [address] [place] [address],[address], [place] [address].  [phone]   [phone]         OUTPATIENT OCCUPATIONAL THERAPY: Daily Treatment Note 2020  Visit Count:  11    ICD-10: Treatment Diagnosis:   Muscle weakness (generalized) (M62.81)  Stiffness of left hand, not elsewhere classified (K05.843)  Precautions/Allergies:   Patient has no allergy information on record. TREATMENT PLAN:  Effective Dates:  2020 to 2020. Frequency/Duration: 1-2x/week for 90 Day(s)    Pre-treatment Symptoms/Complaints:  Pt states, \"My pain is about the same. The doctor said for me to keep going with another 12 therapy visits. He said he would not write me back into work until my hand stopped shaking. \"   Pain: Initial: Pain Intensity 1: 3  Pain Location 1: Finger (comment which one)(Left index)  Pain Orientation 1: Distal /10 Post Session:  2/10   Medications Last Reviewed:  2020    Updated Objective Findings:  see below:   ROM:    L MP: WFL  L PIP flexion: 73 degrees; 20=75 degrees; 20=91  L DIP flexion: 0 degrees' 20=45 degrees; 20=60     R DIP flexion: 75 degrees     Strength:    L : 40, 30, 40; 20=41 pounds; 20=58 pounds  R : 77, 80, 85; 20=119 pounds; 20=119 pounds     L tip: 1 lbs;  20=7; 20=8 pounds  L 3JC: 3 lbs;  20=12; 20=15 pounds     R tip: 12;  20=13; 20=17 pounds  R 3JC: 14;  20=22; 20=19 pounds   TREATMENT:     Therapeutic Exercise: (  35 minutes):  Exercises per grid below to improve mobility and strength. Required minimal visual and verbal cues to promote proper body posture and promote proper body mechanics. Progressed resistance, range, repetitions and complexity of movement as indicated.      Date:  20 Date:  20 Date:  8/10/20 Date:  20 Date:  20 Date:  20 Activity/Exercise          AROM  Full fist 2 x 10  Hook fist 2 x 10  Flat fist 1 x 10    In fluidotherapy x 15 minutes Full fist 2 x 10  Hook fist 2 x 10  Flat fist 1 x 10    In fluidotherapy x 15 minutes Full fist 2 x 10  Hook fist 2 x 10  Flat fist 1 x 10    In fluidotherapy x 15 minutes Full fist 2 x 10  Hook fist 2 x 10  Flat fist 1 x 10    In fluidotherapy x 10 minutes Full fist 2 x 10  Hook fist 2 x 10  Flat fist 1 x 10    In fluidotherapy x 10 minutes In fluidotherapy x 10 minutes Full fist 2 x 10  Hook fist 2 x 10  Flat fist 1 x 10    In fluidotherapy x 10 minutes   DIP Blocking Exercises  1 x 10 2 x 10 2 x 10 2 x 10 2 x 10    Resistive Putty      Green  Full fist x 15 reps    Pinch x 20 reps    HEP          Hand helper 2 x 10  25 pounds 2 x 10  25 pounds 2 x 10  25 pounds 2 x 15  25 pounds 2 x 15  30 pounds 2 x 15  30 pounds 2 x 15  35 pounds     Resistive clothespin Blue x 30 sponge pieces Blue x 30 sponge pieces Blue x 30 sponge pieces Blue x 30 sponge pieces Blue x 30 sponge pieces Blue x 30 sponge pieces Black x 30 sponge pieces   Digiflex Index only and full fist  Yellow   2 x 10 each Index only and full fist  Yellow   2 x 10 each Index only and full fist  Yellow   2 x 10 each Index only   Yellow   2 x 10  and full fist green 2 x 10 Index only   Yellow   2 x 10  and full fist green 2 x 10 Index only   Yellow   2 x 10  and full fist green 2 x 10 Index only   Yellow   2 x 10  and full fist green 2 x 10   PROM 1 x 15 with hold at end range of DIP flexion 1 x 15 with hold at end range of DIP flexion 2 x 10 with hold at end range of DIP flexion 2 x 10 with hold at end range of DIP flexion 2 x 10 with hold at end range of DIP flexion 2 x 10 with hold at end range of DIP flexion 2 x 10 with hold at end range of DIP flexion                                     Manual Therapy: (10 minutes): Soft tissue mobilization was utilized and necessary because of the patient's restricted motion of soft tissue.  Retrograde massage of left index finger, hand and forearm to decrease edema and increase functional range of motion. Treatment/Session Summary:  Pt tolerating continued gentle progressive range of motion and strengthening; noted improvements in range of motion and  and pinch measurements today. Pain went from 3/10 upon arrival to 2/10 at end of session. Pain report overall since last session stayed a 3/10. Patient also complains of tingling and shaking in left index finger and hand at rest and with use. Tolerated an increase in resistance with  and pinch today. · Response to Treatment:  no adverse reaction. · Communication/Consultation:  OT role and plan of care  · Equipment provided today:  Green theraputty  · Recommendations/Intent for next treatment session: Next visit will focus on advancement towards more challenging exercises and reduction in the amount of assistance provided. \"    Total Treatment Billable Duration:  45 minutes   OT Patient Time In/Time Out  Time In: 9008  Time Out: 736 [facility],     Future Appointments   Date Time Provider Eric Huitronisti   9/14/2020 11:00 AM JESSICA Haq   9/21/2020  1:00 PM JESSICA Haq   9/28/2020  1:00 PM Tessa Pantoja OT [facility] DIANE

## 2020-09-21 ENCOUNTER — HOSPITAL ENCOUNTER (OUTPATIENT)
Dept: PHYSICAL THERAPY | Age: 45
Discharge: HOME | End: 2020-09-21
Payer: COMMERCIAL

## 2020-09-21 PROCEDURE — 97140 MANUAL THERAPY 1/> REGIONS: CPT

## 2020-09-21 PROCEDURE — 97110 THERAPEUTIC EXERCISES: CPT

## 2020-09-21 NOTE — THERAPY RECERTIFICATION
Dl García : 1975 Primary: Lamount Mantle Secondary:  [facility] [address], [address], [place] [address]. [phone]   [phone] OUTPATIENT OCCUPATIONAL THERAPY:Recertification  ICD-10: Treatment Diagnosis: Muscle weakness (generalized) (M62.81) Stiffness of left hand, not elsewhere classified (M25.642) Precautions/Allergies:  
Patient has no allergy information on record. TREATMENT PLAN: 
Effective Dates: 2020 to 2020. Frequency/Duration: 1-2x/week for 90 Day(s) MEDICAL/REFERRING DIAGNOSIS: 
Displaced fracture of distal phalanx of unspecified finger, initial encounter for open fracture [S62.639B] DATE OF ONSET:  REFERRING PHYSICIAN: Shun Humphrey MD MD Orders: eval and treat Return MD Appointment: Unknown. 20: Mr. Jonh Alves is making good progress toward his goals with improving range of motion and strength of the left upper extremity. He continues to complain of shakiness and shooting, tingling pain in the left index finger. Feel he may continue to benefit from skilled occupational therapy to maximize functional use of left hand in activities of daily living.  
 
20: Mr. Montoya's care was interrupted by the [place] pandemic. He has completed 3 occupational therapy visits and is making good progress toward his goals. His DIP range of motion has improved significantly since evaluation. He continues to complain of sharp, shooting intermittent pain in the left index finger. Noted shaking of hand during therapeutic exercise. Feel he may continue to benefit from skilled occupational therapy to maximize functional use of left hand in activities of daily living. INITIAL ASSESSMENT:  Mr. Jonh Alves presents for displaced fracture of L DIP s/p laceration to DIP in work incident.  Upon evaluation, pt presents with decreased DIP flexion AROM as well as limited tip/3JC pinch and  strength. At this time, [place] would benefit from skilled OT services to maximize functional use of L hand during ADLs, IADLs, and work tasks. PROBLEM LIST (Impacting functional limitations): 1. Decreased Strength 2. Decreased ADL/Functional Activities 3. Increased Pain 4. Decreased Activity Tolerance 5. Decreased Flexibility/Joint Mobility 6. Decreased [place] with Home Exercise Program INTERVENTIONS PLANNED: (Treatment may consist of any combination of the following) 1. Activities of daily living training 2. Adaptive equipment training 3. Donning&doffing training 4. Manual therapy training 5. Modalities 6. Neuromuscular re-eduation 7. Re-evaluation 8. Splinting 9. Therapeutic activity 10. Therapeutic exercise GOALS: (Goals have been discussed and agreed upon with patient.) Short-Term Functional Goals: Time Frame: 45 days 1. Patient will be independent with HEP within 2 visits of initial evaluation in order to maintain gains achieved during therapy. Met Discharge Goals: Time Frame: 90 days 1. Patient will report increased independence with activities of daily living, evidenced by a score of < 30 on the Quick Dash. Continue to address 2. Patient will demonstrate improved left  strength by 10 lbs to increase independence with completing heavy household chores. Met 3. Patient will improve left DIP flexion AROM by 20 degrees for increased ability to complete feeding tasks. Continue to address 4. Patient will improve L tip pinch by 5 degrees to for increased ability to open tight or new jars. Continue to address OUTCOME MEASURE:  
Tool Used: Disabilities of the Arm, Shoulder and Hand (DASH) Questionnaire - Quick Version Score:  Initial: 46/55  Most Recent: X/55 (Date: -- ) Interpretation of Score:  The DASH is designed to measure the activities of daily living in person's with upper extremity dysfunction or pain. Each section is scored on a 1-5 scale, 5 representing the greatest disability. The scores of each section are added together for a total score of 55. MEDICAL NECESSITY:  
· Patient demonstrates good rehab potential due to higher previous functional level. REASON FOR SERVICES/OTHER COMMENTS: 
· Patient continues to require skilled intervention due to medical complications and patient unable to attend/participate in therapy as expected. Total Duration: OT Patient Time In/Time Out Time In: 1300 Time Out: 5036 Rehabilitation Potential For Stated Goals: Good Regarding Shelby Montoya's therapy, I certify that the treatment plan above will be carried out by a therapist or under their direction. Thank you for this referral, 
Amber Charles OT Referring Physician Signature: Attila Sheridan MD _______________________________ Date _____________ PAIN/SUBJECTIVE:  
Initial: Pain Intensity 1: 3 Pain Location 1: Finger (comment which one)(Left index) Pain Orientation 1: Distal  /10 Post Session:  0/10 OCCUPATIONAL PROFILE & HISTORY:  
History of Injury/Illness (Reason for Referral): Work incident resulting in deep laceration to the bone of his L index DIP resulting in limited DIP flexion as well as decreased  and pinch strength. Reports random occurrence of numbness, pain and swelling. Notes pain to be 10/10 with activity. Past Medical History/Comorbidities:  
Mr. Mary Herrmann  has no past medical history on file. Mr. Mary Herrmann  has no past surgical history on file. Social History/Living Environment:  
  
Prior Level of Function/Work/Activity: 
Independent with ADLs, IADLs, and work tasks. Dominant Side:  
      RIGHT Personal Factors:   
      Sex:  male Age:  39 y.o. Ambulatory/Rehab Services H2 Model Falls Risk Assessment Risk Factors: 
     (1)  Gender [Male] Ability to Rise from Chair: (0)  Ability to rise in a single movement Falls Prevention Plan: No modifications necessary Total: (5 or greater = High Risk): 0  
©2010 [facility] [employer] States Patent #[id number]. Federal Law prohibits the replication, distribution or use without written permission from [facility] [place] Current Medications: No current outpatient medications on file. Date Last Reviewed:  9/21/2020 Complexity Level: Brief history (0):  LOW COMPLEXITY ASSESSMENT OF OCCUPATIONAL PERFORMANCE:  
ROM:   
L MP: WFL 
L PIP flexion: 73 degrees; 6/17/20=75 degrees; 8/24/20=91 L DIP flexion: 0 degrees' 6/17/20=45 degrees; 8/24/20=60 
  
R DIP flexion: 75 degrees 
  
Strength:   
L : 40, 30, 40; 6/17/20=41 pounds; 8/24/20=58 pounds R : 77, 80, 85; 6/17/20=119 pounds; 8/24/20=119 pounds 
  
L tip: 1 lbs;  6/17/20=7; 8/24/20=8 pounds L 3JC: 3 lbs;  6/17/20=12; 8/24/20=15 pounds 
  
R tip: 12;  6/17/20=13; 8/24/20=17 pounds R 3JC: 14;  6/17/20=22; 8/24/20=19 pounds Activities of Daily Living:  
       
Basic ADLs (From Assessment) Complex ADLs (From Assessment) Grooming/Bathing/Dressing Activities of Daily Living

## 2020-09-21 NOTE — PROGRESS NOTES
Sherren Silva  : 1975  Primary: Lev Plant [employer]  Secondary:  [address]  at [facility]  [address], [address], [address] [facility]  [phone]   [phone]         OUTPATIENT OCCUPATIONAL THERAPY: Daily Treatment Note 2020  Visit Count:  12    ICD-10: Treatment Diagnosis:   Muscle weakness (generalized) (M62.81)  Stiffness of left hand, not elsewhere classified (F60.193)  Precautions/Allergies:   Patient has no allergy information on record. TREATMENT PLAN:  Effective Dates: 2020 to 2020. Frequency/Duration: 1-2x/week for 90 Day(s)    Pre-treatment Symptoms/Complaints:  Pt states, \"My pain is about the same; no better no worse. \"   Pain: Initial: Pain Intensity 1: 3  Pain Location 1: Finger (comment which one)(Left index)  Pain Orientation 1: Distal 10 Post Session:  2/10   Medications Last Reviewed:  2020    Updated Objective Findings:  see below:   ROM:    L MP: WFL  L PIP flexion: 73 degrees; 20=75 degrees; 20=91  L DIP flexion: 0 degrees' 20=45 degrees; 20=60     R DIP flexion: 75 degrees     Strength:    L : 40, 30, 40; 20=41 pounds; 20=58 pounds  R : 77, 80, 85; 20=119 pounds; 20=119 pounds     L tip: 1 lbs;  20=7; 20=8 pounds  L 3JC: 3 lbs;  20=12; 20=15 pounds     R tip: 12;  20=13; 20=17 pounds  R 3JC: 14;  20=22; 20=19 pounds   TREATMENT:     Therapeutic Exercise: (  35 minutes):  Exercises per grid below to improve mobility and strength. Required minimal visual and verbal cues to promote proper body posture and promote proper body mechanics. Progressed resistance, range, repetitions and complexity of movement as indicated.      20 Date:  20 Date:  8/10/20 Date:  20 Date:  20 Date:  20   Activity/Exercise         AROM  Full fist 2 x 10  Hook fist 2 x 10  Flat fist 1 x 10    In fluidotherapy x 15 minutes Full fist 2 x 10  Hook fist 2 x 10  Flat fist 1 x 10    In fluidotherapy x 15 minutes Full fist 2 x 10  Hook fist 2 x 10  Flat fist 1 x 10    In fluidotherapy x 10 minutes Full fist 2 x 10  Hook fist 2 x 10  Flat fist 1 x 10    In fluidotherapy x 10 minutes In fluidotherapy x 10 minutes In fluidotherapy x 10 minutes    Full fist 2 x 10  Hook fist 2 x 10  Flat fist 1 x 10   DIP Blocking Exercises 1 x 10 2 x 10 2 x 10 2 x 10 2 x 10 2 x 10   Resistive Putty     Green  Full fist x 15 reps    Pinch x 20 reps    HEP         Hand helper 2 x 10  25 pounds 2 x 10  25 pounds 2 x 15  25 pounds 2 x 15  30 pounds 2 x 15  30 pounds 2 x 15  35 pounds     Resistive clothespin Blue x 30 sponge pieces Blue x 30 sponge pieces Blue x 30 sponge pieces Blue x 30 sponge pieces Blue x 30 sponge pieces Black x 30 sponge pieces   Digiflex Index only and full fist  Yellow   2 x 10 each Index only and full fist  Yellow   2 x 10 each Index only   Yellow   2 x 10  and full fist green 2 x 10 Index only   Yellow   2 x 10  and full fist green 2 x 10 Index only   Yellow   2 x 10  and full fist green 2 x 10 Index only   Yellow and green  1 x 10 each  and full fist green 2 x 10   PROM 1 x 15 with hold at end range of DIP flexion 2 x 10 with hold at end range of DIP flexion 2 x 10 with hold at end range of DIP flexion 2 x 10 with hold at end range of DIP flexion 2 x 10 with hold at end range of DIP flexion 2 x 10 with hold at end range of DIP flexion                                  Manual Therapy: (10 minutes): Soft tissue mobilization was utilized and necessary because of the patient's restricted motion of soft tissue. Retrograde massage of left index finger, hand and forearm to decrease edema and increase functional range of motion. Treatment/Session Summary:  Pt tolerating continued gentle progressive range of motion and strengthening; tolerating increasing resistance levels. Pain went from 3/10 upon arrival to 2/10 at end of session.  Pain report overall since last session stayed a 3/10. Patient also complains of tingling and shaking in left index finger and hand at rest and with use. · Response to Treatment:  no adverse reaction. · Communication/Consultation:  OT role and plan of care  · Equipment provided today:  Green theraputty  · Recommendations/Intent for next treatment session: Next visit will focus on advancement towards more challenging exercises and reduction in the amount of assistance provided. \"    Total Treatment Billable Duration:  45 minutes   OT Patient Time In/Time Out  Time In: 1300  Time Out: [place], OT    Future Appointments   Date Time Provider Eric Hurtado   9/28/2020  1:00 PM JESSICA Arboleda   10/7/2020  1:00 PM Boo Edwards OT [facility] DIANE   10/13/2020 11:00 AM JESSICA ArboledaORPCAMILLE CONTRERAS   10/22/2020  1:00 PM Boo Edwards OT [facility]   10/28/2020  1:00 PM Boo Edwards OT [facility] BHARATHIE

## 2020-09-28 ENCOUNTER — HOSPITAL ENCOUNTER (OUTPATIENT)
Dept: PHYSICAL THERAPY | Age: 45
Discharge: HOME | End: 2020-09-28
Payer: COMMERCIAL

## 2020-09-28 PROCEDURE — 97140 MANUAL THERAPY 1/> REGIONS: CPT

## 2020-09-28 PROCEDURE — 97110 THERAPEUTIC EXERCISES: CPT

## 2020-09-28 NOTE — PROGRESS NOTES
Kel Benavidez  : 1975  Primary: Flavio Bower  Secondary:  [address] at [address], [address] [place] [address],[address], [facility] [address].  [phone]   [phone]         OUTPATIENT OCCUPATIONAL THERAPY: Daily Treatment Note 2020  Visit Count:  13    ICD-10: Treatment Diagnosis:   Muscle weakness (generalized) (M62.81)  Stiffness of left hand, not elsewhere classified (M94.176)  Precautions/Allergies:   Patient has no allergy information on record. TREATMENT PLAN:  Effective Dates: 2020 to 2020. Frequency/Duration: 1-2x/week for 90 Day(s)    Pre-treatment Symptoms/Complaints:  Pt states, \"My finger bothered me a little more than normal this weekend; I am not sure why; maybe the weather. \"   Pain: Initial: Pain Intensity 1: 3  Pain Location 1: Finger (comment which one)(Left index)  Pain Orientation 1: Distal /10 Post Session:  2/10   Medications Last Reviewed:  2020    Updated Objective Findings:  see below:   ROM:    L MP: WFL  L PIP flexion: 73 degrees; 20=75 degrees; 20=91  L DIP flexion: 0 degrees' 20=45 degrees; 20=60     R DIP flexion: 75 degrees     Strength:    L : 40, 30, 40; 20=41 pounds; 20=58 pounds  R : 77, 80, 85; 20=119 pounds; 20=119 pounds     L tip: 1 lbs;  20=7; 20=8 pounds  L 3JC: 3 lbs;  20=12; 20=15 pounds     R tip: 12;  20=13; 20=17 pounds  R 3JC: 14;  20=22; 20=19 pounds   TREATMENT:     Therapeutic Exercise: (  35 minutes):  Exercises per grid below to improve mobility and strength. Required minimal visual and verbal cues to promote proper body posture and promote proper body mechanics. Progressed resistance, range, repetitions and complexity of movement as indicated.      Date:  20 Date:  8/10/20 Date:  20 Date:  20 Date:  20 Date:  20   Activity/Exercise         AROM  Full fist 2 x 10  Hook fist 2 x 10  Flat fist 1 x 10    In fluidotherapy x 15 minutes Full fist 2 x 10  Hook fist 2 x 10  Flat fist 1 x 10    In fluidotherapy x 10 minutes Full fist 2 x 10  Hook fist 2 x 10  Flat fist 1 x 10    In fluidotherapy x 10 minutes In fluidotherapy x 10 minutes In fluidotherapy x 10 minutes    Full fist 2 x 10  Hook fist 2 x 10  Flat fist 1 x 10 In fluidotherapy x 10 minutes    Full fist 2 x 10  Hook fist 2 x 10  Flat fist 1 x 10   DIP Blocking Exercises 2 x 10 2 x 10 2 x 10 2 x 10 2 x 10 2 x 10   Resistive Putty    Green  Full fist x 15 reps    Pinch x 20 reps     HEP         Hand helper 2 x 10  25 pounds 2 x 15  25 pounds 2 x 15  30 pounds 2 x 15  30 pounds 2 x 15  35 pounds 2 x 15  35 pounds     Resistive clothespin Blue x 30 sponge pieces Blue x 30 sponge pieces Blue x 30 sponge pieces Blue x 30 sponge pieces Black x 30 sponge pieces Black x 30 sponge pieces   Digiflex Index only and full fist  Yellow   2 x 10 each Index only   Yellow   2 x 10  and full fist green 2 x 10 Index only   Yellow   2 x 10  and full fist green 2 x 10 Index only   Yellow   2 x 10  and full fist green 2 x 10 Index only   Yellow and green  1 x 10 each  and full fist green 2 x 10 Index only   Yellow and green  1 x 10 each  and full fist green 2 x 10   PROM 2 x 10 with hold at end range of DIP flexion 2 x 10 with hold at end range of DIP flexion 2 x 10 with hold at end range of DIP flexion 2 x 10 with hold at end range of DIP flexion 2 x 10 with hold at end range of DIP flexion 2 x 10 with hold at end range of DIP flexion                                  Manual Therapy: (10 minutes): Soft tissue mobilization was utilized and necessary because of the patient's restricted motion of soft tissue. Retrograde massage of left index finger, hand and forearm to decrease edema and increase functional range of motion. Treatment/Session Summary:  Pt tolerating continued gentle progressive range of motion and strengthening; tolerating increasing resistance levels.   Pain went from 3/10 upon arrival to 2/10 at end of session. Pain report overall since last session stayed a 3/10 although he reports a slight increase over the weekend. Patient also complains of tingling and shaking in left index finger and hand at rest and with use. · Response to Treatment:  no adverse reaction. · Communication/Consultation:  OT role and plan of care  · Equipment provided today:  Green theraputty  · Recommendations/Intent for next treatment session: Next visit will focus on advancement towards more challenging exercises and reduction in the amount of assistance provided. \"    Total Treatment Billable Duration:  45 minutes   OT Patient Time In/Time Out  Time In: 1300  Time Out: [address], OT    Future Appointments   Date Time Provider Eric Hurtado   10/7/2020  1:00 PM Aureliano Meneses OT [facility]   10/13/2020 11:00 AM JESSICA Gonzalez   10/22/2020  1:00 PM Aureliano Meneses OT [facility]   10/28/2020  1:00 PM Aureliano Meneses OT [facility] DIANE

## 2020-10-07 ENCOUNTER — HOSPITAL ENCOUNTER (OUTPATIENT)
Dept: PHYSICAL THERAPY | Age: 45
Discharge: HOME | End: 2020-10-07
Payer: COMMERCIAL

## 2020-10-07 PROCEDURE — 97110 THERAPEUTIC EXERCISES: CPT

## 2020-10-07 PROCEDURE — 97140 MANUAL THERAPY 1/> REGIONS: CPT

## 2020-10-07 NOTE — PROGRESS NOTES
Addi Becerra  : 1975  Primary: Erin Bowers [facility]  Secondary:  [address] at [address], [address] [place] [address],[address], [place] [address].  [phone]   [phone]         OUTPATIENT OCCUPATIONAL THERAPY: Daily Treatment Note 10/7/2020  Visit Count:  14    ICD-10: Treatment Diagnosis:   Muscle weakness (generalized) (M62.81)  Stiffness of left hand, not elsewhere classified (Z95.246)  Precautions/Allergies:   Patient has no allergy information on record. TREATMENT PLAN:  Effective Dates: 2020 to 2020. Frequency/Duration: 1-2x/week for 90 Day(s)    Pre-treatment Symptoms/Complaints:  Pt states, \"My finger has been feeling better this week. \"   Pain: Initial: Pain Intensity 1: 2  Pain Location 1: Finger (comment which one)(Left index)  Pain Orientation 1: 10 Post Session:  1/10   Medications Last Reviewed:  10/7/2020    Updated Objective Findings:  see below:   ROM:    L MP: WFL  L PIP flexion: 73 degrees; 20=75 degrees; 20=91  L DIP flexion: 0 degrees' 20=45 degrees; 20=60     R DIP flexion: 75 degrees     Strength:    L : 40, 30, 40; 20=41 pounds; 20=58 pounds  R : 77, 80, 85; 20=119 pounds; 20=119 pounds     L tip: 1 lbs;  20=7; 20=8 pounds  L 3JC: 3 lbs;  20=12; 20=15 pounds     R tip: 12;  20=13; 20=17 pounds  R 3JC: 14;  20=22; 20=19 pounds   TREATMENT:     Therapeutic Exercise: (  30 minutes):  Exercises per grid below to improve mobility and strength. Required minimal visual and verbal cues to promote proper body posture and promote proper body mechanics. Progressed resistance, range, repetitions and complexity of movement as indicated.      Date:  20 Date:  8/10/20 Date:  20 Date:  20 Date:  20 Date:  20 Date:  10/7/20   Activity/Exercise          AROM  Full fist 2 x 10  Hook fist 2 x 10  Flat fist 1 x 10    In fluidotherapy x 15 minutes Full fist 2 x 10  Hook fist 2 x 10  Flat fist 1 x 10    In fluidotherapy x 10 minutes Full fist 2 x 10  Hook fist 2 x 10  Flat fist 1 x 10    In fluidotherapy x 10 minutes In fluidotherapy x 10 minutes In fluidotherapy x 10 minutes    Full fist 2 x 10  Hook fist 2 x 10  Flat fist 1 x 10 In fluidotherapy x 10 minutes    Full fist 2 x 10  Hook fist 2 x 10  Flat fist 1 x 10 In fluidotherapy x 10 minutes    Full fist 2 x 10  Hook fist 2 x 10  Flat fist 1 x 10   DIP Blocking Exercises 2 x 10 2 x 10 2 x 10 2 x 10 2 x 10 2 x 10 2 x 10   Resistive Putty    Green  Full fist x 15 reps    Pinch x 20 reps      HEP          Hand helper 2 x 10  25 pounds 2 x 15  25 pounds 2 x 15  30 pounds 2 x 15  30 pounds 2 x 15  35 pounds 2 x 15  35 pounds 2 x 15  35 pounds     Resistive clothespin Blue x 30 sponge pieces Blue x 30 sponge pieces Blue x 30 sponge pieces Blue x 30 sponge pieces Black x 30 sponge pieces Black x 30 sponge pieces Black x 30 sponge pieces   Digiflex Index only and full fist  Yellow   2 x 10 each Index only   Yellow   2 x 10  and full fist green 2 x 10 Index only   Yellow   2 x 10  and full fist green 2 x 10 Index only   Yellow   2 x 10  and full fist green 2 x 10 Index only   Yellow and green  1 x 10 each  and full fist green 2 x 10 Index only   Yellow and green  1 x 10 each  and full fist green 2 x 10 Index only   Yellow and green  1 x 10 each  and full fist green 2 x 10   PROM 2 x 10 with hold at end range of DIP flexion 2 x 10 with hold at end range of DIP flexion 2 x 10 with hold at end range of DIP flexion 2 x 10 with hold at end range of DIP flexion 2 x 10 with hold at end range of DIP flexion 2 x 10 with hold at end range of DIP flexion 2 x 10 with hold at end range of DIP flexion                                     Manual Therapy: (10 minutes): Soft tissue mobilization was utilized and necessary because of the patient's restricted motion of soft tissue.  Retrograde massage of left index finger, hand and forearm to decrease edema and increase functional range of motion. Treatment/Session Summary:  Pt tolerating continued gentle progressive range of motion and strengthening; tolerating increasing resistance levels. Pain went from 2/10 upon arrival to 1/10 at end of session. Pain report overall since last session stayed a 2/10; improved since last visit. · Response to Treatment:  no adverse reaction. · Communication/Consultation:  OT role and plan of care  · Equipment provided today:  Green theraputty  · Recommendations/Intent for next treatment session: Next visit will focus on advancement towards more challenging exercises and reduction in the amount of assistance provided. \"    Total Treatment Billable Duration:  40 minutes   OT Patient Time In/Time Out  Time In: 5868  Time Out: [place], OT    Future Appointments   Date Time Provider Eric Hurtado   10/13/2020 11:00 AM Sahnnan Yoon OT [facility]   10/22/2020  1:00 PM JESSICA Taylor [facility]   10/28/2020  1:00 PM Shannan Yoon OT [facility]

## 2020-10-13 ENCOUNTER — HOSPITAL ENCOUNTER (OUTPATIENT)
Dept: PHYSICAL THERAPY | Age: 45
Discharge: HOME | End: 2020-10-13
Payer: COMMERCIAL

## 2020-10-13 PROCEDURE — 97110 THERAPEUTIC EXERCISES: CPT

## 2020-10-13 PROCEDURE — 97140 MANUAL THERAPY 1/> REGIONS: CPT

## 2020-10-13 NOTE — PROGRESS NOTES
Yadiel Ingram  : 1975  Primary: Roberto Bower  Secondary:  [address] at [place] [address] [facility], [address] [place] [address],[address], [facility] [address].  [phone]   [phone]         OUTPATIENT OCCUPATIONAL THERAPY: Daily Treatment Note 10/13/2020  Visit Count:  15    ICD-10: Treatment Diagnosis:   Muscle weakness (generalized) (M62.81)  Stiffness of left hand, not elsewhere classified (Q46.614)  Precautions/Allergies:   Patient has no allergy information on record. TREATMENT PLAN:  Effective Dates: 2020 to 2020. Frequency/Duration: 1-2x/week for 90 Day(s)    Pre-treatment Symptoms/Complaints:  Pt states, \"My finger has been hurting a little. \"   Pain: Initial: Pain Intensity 1: 3  Pain Location 1: Finger (comment which one)(left index)  Pain Orientation 1: Distal 10 Post Session:  2/10   Medications Last Reviewed:  10/13/2020    Updated Objective Findings:  see below:   ROM:    L MP: WFL  L PIP flexion: 73 degrees; 20=75 degrees; 20=91  L DIP flexion: 0 degrees' 20=45 degrees; 20=60     R DIP flexion: 75 degrees     Strength:    L : 40, 30, 40; 20=41 pounds; 20=58 pounds  R : 77, 80, 85; 20=119 pounds; 20=119 pounds     L tip: 1 lbs;  20=7; 20=8 pounds  L 3JC: 3 lbs;  20=12; 20=15 pounds     R tip: 12;  20=13; 20=17 pounds  R 3JC: 14;  20=22; 20=19 pounds   TREATMENT:     Therapeutic Exercise: (  35 minutes):  Exercises per grid below to improve mobility and strength. Required minimal visual and verbal cues to promote proper body posture and promote proper body mechanics. Progressed resistance, range, repetitions and complexity of movement as indicated.      Date:  8/10/20 Date:  20 Date:  20 Date:  20 Date:  20 Date:  10/7/20 Date:  10/13/20   Activity/Exercise          AROM  Full fist 2 x 10  Hook fist 2 x 10  Flat fist 1 x 10    In fluidotherapy x 10 minutes Full fist 2 x 10  Hook fist 2 x 10  Flat fist 1 x 10    In fluidotherapy x 10 minutes In fluidotherapy x 10 minutes In fluidotherapy x 10 minutes    Full fist 2 x 10  Hook fist 2 x 10  Flat fist 1 x 10 In fluidotherapy x 10 minutes    Full fist 2 x 10  Hook fist 2 x 10  Flat fist 1 x 10 In fluidotherapy x 10 minutes    Full fist 2 x 10  Hook fist 2 x 10  Flat fist 1 x 10 In fluidotherapy x 10 minutes    Full fist 2 x 10  Hook fist 2 x 10  Flat fist 2 x 10   DIP Blocking Exercises 2 x 10 2 x 10 2 x 10 2 x 10 2 x 10 2 x 10 2 x 10   Resistive Putty   Green  Full fist x 15 reps    Pinch x 20 reps       HEP          Hand helper 2 x 15  25 pounds 2 x 15  30 pounds 2 x 15  30 pounds 2 x 15  35 pounds 2 x 15  35 pounds 2 x 15  35 pounds 2 x 15  35 pounds     Resistive clothespin Blue x 30 sponge pieces Blue x 30 sponge pieces Blue x 30 sponge pieces Black x 30 sponge pieces Black x 30 sponge pieces Black x 30 sponge pieces Black x 30 sponge pieces   Digiflex Index only   Yellow   2 x 10  and full fist green 2 x 10 Index only   Yellow   2 x 10  and full fist green 2 x 10 Index only   Yellow   2 x 10  and full fist green 2 x 10 Index only   Yellow and green  1 x 10 each  and full fist green 2 x 10 Index only   Yellow and green  1 x 10 each  and full fist green 2 x 10 Index only   Yellow and green  1 x 10 each  and full fist green 2 x 10 Index only   Yellow and green  2 x 10 each  and full fist green 2 x 10   PROM 2 x 10 with hold at end range of DIP flexion 2 x 10 with hold at end range of DIP flexion 2 x 10 with hold at end range of DIP flexion 2 x 10 with hold at end range of DIP flexion 2 x 10 with hold at end range of DIP flexion 2 x 10 with hold at end range of DIP flexion 2 x 10 with hold at end range of DIP flexion                                     Manual Therapy: (10 minutes): Soft tissue mobilization was utilized and necessary because of the patient's restricted motion of soft tissue.  Retrograde massage of left index finger, hand and forearm to decrease edema and increase functional range of motion. Treatment/Session Summary:  Pt tolerating continued gentle progressive range of motion and strengthening; tolerating increasing resistance levels. Pain went from 2/10 upon arrival to 1/10 at end of session. Pain report overall since last session a 3/10. · Response to Treatment:  no adverse reaction. · Communication/Consultation:  OT role and plan of care  · Equipment provided today:  Green theraputty  · Recommendations/Intent for next treatment session: Next visit will focus on advancement towards more challenging exercises and reduction in the amount of assistance provided. \"    Total Treatment Billable Duration:  45 minutes   OT Patient Time In/Time Out  Time In: 7866  Time Out: [facility],     Future Appointments   Date Time Provider Eric Hurtado   10/22/2020  1:00 PM Keiko Gautam OT [facility]   10/28/2020  1:00 PM Keiko Gautam OT [facility] DI[facility]

## 2020-10-22 ENCOUNTER — HOSPITAL ENCOUNTER (OUTPATIENT)
Dept: PHYSICAL THERAPY | Age: 45
Discharge: HOME | End: 2020-10-22
Payer: COMMERCIAL

## 2020-10-22 PROCEDURE — 97110 THERAPEUTIC EXERCISES: CPT

## 2020-10-22 PROCEDURE — 97140 MANUAL THERAPY 1/> REGIONS: CPT

## 2020-10-22 NOTE — PROGRESS NOTES
Kel Benavidez  : 1975  Primary: Flavio Bower  Secondary:  [address] at [address] [place] [address],[address], [place] [address].  [phone]   [phone]         OUTPATIENT OCCUPATIONAL THERAPY: Daily Treatment Note 10/22/2020  Visit Count:  16    ICD-10: Treatment Diagnosis:   Muscle weakness (generalized) (M62.81)  Stiffness of left hand, not elsewhere classified (Z75.823)  Precautions/Allergies:   Patient has no allergy information on record. TREATMENT PLAN:  Effective Dates: 2020 to 2020. Frequency/Duration: 1-2x/week for 90 Day(s)    Pre-treatment Symptoms/Complaints:  Pt states, \"My pain is about a 3 like it has been. \"   Pain: Initial: Pain Intensity 1: 3  Pain Location 1: Finger (comment which one)(Left index)  Pain Orientation 1: Distal /10 Post Session:  2/10   Medications Last Reviewed:  10/22/2020    Updated Objective Findings:  see below:   ROM:    L MP: WFL  L PIP flexion: 73 degrees; 20=75 degrees; 20=91  L DIP flexion: 0 degrees' 20=45 degrees; 20=60     R DIP flexion: 75 degrees     Strength:    L : 40, 30, 40; 20=41 pounds; 20=58 pounds  R : 77, 80, 85; 20=119 pounds; 20=119 pounds     L tip: 1 lbs;  20=7; 20=8 pounds  L 3JC: 3 lbs;  20=12; 20=15 pounds     R tip: 12;  20=13; 20=17 pounds  R 3JC: 14;  20=22; 20=19 pounds   TREATMENT:     Therapeutic Exercise: (  35 minutes):  Exercises per grid below to improve mobility and strength. Required minimal visual and verbal cues to promote proper body posture and promote proper body mechanics. Progressed resistance, range, repetitions and complexity of movement as indicated.      Date:  20 Date:  20 Date:  20 Date:  20 Date:  10/7/20 Date:  10/13/20 Date:  10/22/20   Activity/Exercise          AROM  Full fist 2 x 10  Hook fist 2 x 10  Flat fist 1 x 10    In fluidotherapy x 10 minutes In fluidotherapy x 10 minutes In fluidotherapy x 10 minutes    Full fist 2 x 10  Hook fist 2 x 10  Flat fist 1 x 10 In fluidotherapy x 10 minutes    Full fist 2 x 10  Hook fist 2 x 10  Flat fist 1 x 10 In fluidotherapy x 10 minutes    Full fist 2 x 10  Hook fist 2 x 10  Flat fist 1 x 10 In fluidotherapy x 10 minutes    Full fist 2 x 10  Hook fist 2 x 10  Flat fist 2 x 10 In fluidotherapy x 10 minutes    Full fist 2 x 10  Hook fist 2 x 10  Flat fist 2 x 10   DIP Blocking Exercises 2 x 10 2 x 10 2 x 10 2 x 10 2 x 10 2 x 10 2 x 10   Resistive Putty  Green  Full fist x 15 reps    Pinch x 20 reps        HEP          Hand helper 2 x 15  30 pounds 2 x 15  30 pounds 2 x 15  35 pounds 2 x 15  35 pounds 2 x 15  35 pounds 2 x 15  35 pounds 2 x 15  35 pounds     Resistive clothespin Blue x 30 sponge pieces Blue x 30 sponge pieces Black x 30 sponge pieces Black x 30 sponge pieces Black x 30 sponge pieces Black x 30 sponge pieces Black x 30 sponge pieces   Digiflex Index only   Yellow   2 x 10  and full fist green 2 x 10 Index only   Yellow   2 x 10  and full fist green 2 x 10 Index only   Yellow and green  1 x 10 each  and full fist green 2 x 10 Index only   Yellow and green  1 x 10 each  and full fist green 2 x 10 Index only   Yellow and green  1 x 10 each  and full fist green 2 x 10 Index only   Yellow and green  2 x 10 each  and full fist green 2 x 10 Index only   Yellow and green  2 x 10 each  and full fist green 2 x 10   PROM 2 x 10 with hold at end range of DIP flexion 2 x 10 with hold at end range of DIP flexion 2 x 10 with hold at end range of DIP flexion 2 x 10 with hold at end range of DIP flexion 2 x 10 with hold at end range of DIP flexion 2 x 10 with hold at end range of DIP flexion 2 x 10 with hold at end range of DIP flexion                                     Manual Therapy: (10 minutes): Soft tissue mobilization was utilized and necessary because of the patient's restricted motion of soft tissue.  Retrograde massage of left index finger, hand and forearm to decrease edema and increase functional range of motion. Treatment/Session Summary:  Pt tolerating continued gentle progressive range of motion and strengthening; tolerating increasing resistance levels. Pain went from 2/10 upon arrival to 1/10 at end of session. Pain report overall since last session has stayed at a 3/10; he reports continued shaking with use of left hand that is worse some days than others. · Response to Treatment:  no adverse reaction. · Communication/Consultation:  OT role and plan of care  · Equipment provided today:  Green theraputty  · Recommendations/Intent for next treatment session: Next visit will focus on advancement towards more challenging exercises and reduction in the amount of assistance provided. \"    Total Treatment Billable Duration:  45 minutes   OT Patient Time In/Time Out  Time In: 1300  Time Out: [place], OT    Future Appointments   Date Time Provider Eric Hurtado   10/28/2020  1:00 PM [facility]

## 2020-11-05 ENCOUNTER — HOSPITAL ENCOUNTER (OUTPATIENT)
Dept: PHYSICAL THERAPY | Age: 45
Discharge: HOME | End: 2020-11-05
Payer: COMMERCIAL

## 2020-11-05 PROCEDURE — 97140 MANUAL THERAPY 1/> REGIONS: CPT

## 2020-11-05 PROCEDURE — 97110 THERAPEUTIC EXERCISES: CPT

## 2020-11-05 NOTE — PROGRESS NOTES
Supa Solo  : 1975  Primary: Samaradiandra Bower  Secondary:  [address] at [place] [id number] [facility], [address] [place] [address] [address], [address] [facility]  [phone]   [phone]         OUTPATIENT OCCUPATIONAL THERAPY: Daily Treatment Note 2020  Visit Count:  16    ICD-10: Treatment Diagnosis:   Muscle weakness (generalized) (M62.81)  Stiffness of left hand, not elsewhere classified (A40.982)  Precautions/Allergies:   Patient has no allergy information on record. TREATMENT PLAN:  Effective Dates: 2020 to 2020. Frequency/Duration: 1-2x/week for 90 Day(s)    Pre-treatment Symptoms/Complaints:  Pt states, \"My pain is about the same as it has been. \"   Pain: Initial: Pain Intensity 1: 3  Pain Location 1: Finger (comment which one)(Left index)  Pain Orientation 1: Distal /10 Post Session:  2/10   Medications Last Reviewed:  2020    Updated Objective Findings:  see below:   ROM:    L MP: WFL  L PIP flexion: 73 degrees; 20=75 degrees; 20=91  L DIP flexion: 0 degrees' 20=45 degrees; 20=60     R DIP flexion: 75 degrees     Strength:    L : 40, 30, 40; 20=41 pounds; 20=58 pounds  R : 77, 80, 85; 20=119 pounds; 20=119 pounds     L tip: 1 lbs;  20=7; 20=8 pounds  L 3JC: 3 lbs;  20=12; 20=15 pounds     R tip: 12;  20=13; 20=17 pounds  R 3JC: 14;  20=22; 20=19 pounds   TREATMENT:     Therapeutic Exercise: (  40 minutes):  Exercises per grid below to improve mobility and strength. Required minimal visual and verbal cues to promote proper body posture and promote proper body mechanics. Progressed resistance, range, repetitions and complexity of movement as indicated.      Date:  20 Date:  20 Date:  20 Date:  10/7/20 Date:  10/13/20 Date:  10/22/20 Date:  20   Activity/Exercise          AROM  In fluidotherapy x 10 minutes In fluidotherapy x 10 minutes    Full fist 2 x 10  Hook fist 2 x 10  Flat fist 1 x 10 In fluidotherapy x 10 minutes    Full fist 2 x 10  Hook fist 2 x 10  Flat fist 1 x 10 In fluidotherapy x 10 minutes    Full fist 2 x 10  Hook fist 2 x 10  Flat fist 1 x 10 In fluidotherapy x 10 minutes    Full fist 2 x 10  Hook fist 2 x 10  Flat fist 2 x 10 In fluidotherapy x 10 minutes    Full fist 2 x 10  Hook fist 2 x 10  Flat fist 2 x 10 In fluidotherapy x 15 minutes    Full fist 2 x 10  Hook fist 2 x 10  Flat fist 2 x 10   DIP Blocking Exercises 2 x 10 2 x 10 2 x 10 2 x 10 2 x 10 2 x 10 2 x 10   Resistive Putty Green  Full fist x 15 reps    Pinch x 20 reps         HEP          Hand helper 2 x 15  30 pounds 2 x 15  35 pounds 2 x 15  35 pounds 2 x 15  35 pounds 2 x 15  35 pounds 2 x 15  35 pounds 2 x 15  40 pounds     Resistive clothespin Blue x 30 sponge pieces Black x 30 sponge pieces Black x 30 sponge pieces Black x 30 sponge pieces Black x 30 sponge pieces Black x 30 sponge pieces Black x 30 sponge pieces   Digiflex Index only   Yellow   2 x 10  and full fist green 2 x 10 Index only   Yellow and green  1 x 10 each  and full fist green 2 x 10 Index only   Yellow and green  1 x 10 each  and full fist green 2 x 10 Index only   Yellow and green  1 x 10 each  and full fist green 2 x 10 Index only   Yellow and green  2 x 10 each  and full fist green 2 x 10 Index only   Yellow and green  2 x 10 each  and full fist green 2 x 10 Index only   Yellow and green  2 x 10 each  and full fist green 2 x 10   PROM 2 x 10 with hold at end range of DIP flexion 2 x 10 with hold at end range of DIP flexion 2 x 10 with hold at end range of DIP flexion 2 x 10 with hold at end range of DIP flexion 2 x 10 with hold at end range of DIP flexion 2 x 10 with hold at end range of DIP flexion 2 x 10 with hold at end range of DIP flexion                                     Manual Therapy: (10 minutes): Soft tissue mobilization was utilized and necessary because of the patient's restricted motion of soft tissue. Retrograde massage of left index finger, hand and forearm to decrease edema and increase functional range of motion. Treatment/Session Summary:  Pt tolerating continued gentle progressive range of motion and strengthening; tolerating increasing resistance levels today. Pain report overall since last session has stayed at a 3/10. · Response to Treatment:  no adverse reaction. · Communication/Consultation:  OT role and plan of care  · Equipment provided today:  None today  · Recommendations/Intent for next treatment session: Next visit will focus on advancement towards more challenging exercises and reduction in the amount of assistance provided. \"    Total Treatment Billable Duration:  50 minutes   OT Patient Time In/Time Out  Time In: 1440  Time Out: 105 [facility], OT    Future Appointments   Date Time Provider Eric Hurtado   11/12/2020  2:30 PM Marli Morin OT [facility]   11/19/2020  2:30 PM JESSICA [facility]annORPT [facility]   11/30/2020  1:00 PM [facility]

## 2020-11-12 ENCOUNTER — HOSPITAL ENCOUNTER (OUTPATIENT)
Dept: PHYSICAL THERAPY | Age: 45
Discharge: HOME | End: 2020-11-12
Payer: COMMERCIAL

## 2020-11-12 NOTE — PROGRESS NOTES
Darlin Osgood  : 1975  Primary: Huy Bower  Secondary:  [address]  at [address] [place] [address] [facility], [place] [address].  [phone]   [phone]       Patient cancelled today's therapy visit due to work conflict.      Bertha Bradley, OT

## 2020-11-19 ENCOUNTER — HOSPITAL ENCOUNTER (OUTPATIENT)
Dept: PHYSICAL THERAPY | Age: 45
Discharge: HOME | End: 2020-11-19
Payer: COMMERCIAL

## 2020-11-19 PROCEDURE — 97140 MANUAL THERAPY 1/> REGIONS: CPT

## 2020-11-19 PROCEDURE — 97110 THERAPEUTIC EXERCISES: CPT

## 2020-11-19 NOTE — PROGRESS NOTES
Roseanne Bolus  : 1975  Primary: Charles Carrasco [employer]  Secondary:  [address]  [facility], [address] [place] [address], [address] [facility]  [phone]   [phone]         OUTPATIENT OCCUPATIONAL THERAPY: Daily Treatment Note 2020  Visit Count:  18    ICD-10: Treatment Diagnosis:   Muscle weakness (generalized) (M62.81)  Stiffness of left hand, not elsewhere classified (S20.000)  Precautions/Allergies:   Patient has no allergy information on record. TREATMENT PLAN:  Effective Dates: 2020 to 2020. Frequency/Duration: 1-2x/week for 90 Day(s)    Pre-treatment Symptoms/Complaints:  Pt reports his pain has not changed since last visit, and that he continues with constant numbness and intermittent tremors in left hand. Pain: Initial: Pain Intensity 1: 3  Pain Location 1: Finger (comment which one)(Left index)  Pain Orientation 1: Distal /10 Post Session:  2/10   Medications Last Reviewed:  2020    Updated Objective Findings:  see below:   ROM:    L MP: WFL  L PIP flexion: 73 degrees; 20=75 degrees; 20=91  L DIP flexion: 0 degrees' 20=45 degrees; 20=60     R DIP flexion: 75 degrees     Strength:    L : 40, 30, 40; 20=41 pounds; 20=58 pounds  R : 77, 80, 85; 20=119 pounds; 20=119 pounds     L tip: 1 lbs;  20=7; 20=8 pounds  L 3JC: 3 lbs;  20=12; 20=15 pounds     R tip: 12;  20=13; 20=17 pounds  R 3JC: 14;  20=22; 20=19 pounds   TREATMENT:     Therapeutic Exercise: (  30 minutes):  Exercises per grid below to improve mobility and strength. Required minimal visual and verbal cues to promote proper body posture and promote proper body mechanics. Progressed resistance, range, repetitions and complexity of movement as indicated.      Date:  20 Date:  20 Date:  10/7/20 Date:  10/13/20 Date:  10/22/20 Date:  20 Date:  20   Activity/Exercise          AROM  In fluidotherapy x 10 minutes    Full fist 2 x 10  Hook fist 2 x 10  Flat fist 1 x 10 In fluidotherapy x 10 minutes    Full fist 2 x 10  Hook fist 2 x 10  Flat fist 1 x 10 In fluidotherapy x 10 minutes    Full fist 2 x 10  Hook fist 2 x 10  Flat fist 1 x 10 In fluidotherapy x 10 minutes    Full fist 2 x 10  Hook fist 2 x 10  Flat fist 2 x 10 In fluidotherapy x 10 minutes    Full fist 2 x 10  Hook fist 2 x 10  Flat fist 2 x 10 In fluidotherapy x 15 minutes    Full fist 2 x 10  Hook fist 2 x 10  Flat fist 2 x 10 In fluidotherapy x 10 minutes   DIP Blocking Exercises 2 x 10 2 x 10 2 x 10 2 x 10 2 x 10 2 x 10 2 x 10   Resistive Putty          HEP          Hand helper 2 x 15  35 pounds 2 x 15  35 pounds 2 x 15  35 pounds 2 x 15  35 pounds 2 x 15  35 pounds 2 x 15  40 pounds 2 x 15  40 pounds     Resistive clothespin Black x 30 sponge pieces Black x 30 sponge pieces Black x 30 sponge pieces Black x 30 sponge pieces Black x 30 sponge pieces Black x 30 sponge pieces Black x 30 sponge pieces   Digiflex Index only   Yellow and green  1 x 10 each  and full fist green 2 x 10 Index only   Yellow and green  1 x 10 each  and full fist green 2 x 10 Index only   Yellow and green  1 x 10 each  and full fist green 2 x 10 Index only   Yellow and green  2 x 10 each  and full fist green 2 x 10 Index only   Yellow and green  2 x 10 each  and full fist green 2 x 10 Index only   Yellow and green  2 x 10 each  and full fist green 2 x 10 Index only   Yellow and green  2 x 10 each  and full fist green 2 x 10   PROM 2 x 10 with hold at end range of DIP flexion 2 x 10 with hold at end range of DIP flexion 2 x 10 with hold at end range of DIP flexion 2 x 10 with hold at end range of DIP flexion 2 x 10 with hold at end range of DIP flexion 2 x 10 with hold at end range of DIP flexion 2 x 10 with hold at end range of DIP flexion                                     Manual Therapy: (10 minutes):  Soft tissue mobilization was utilized and necessary because of the patient's restricted motion of soft tissue. Retrograde massage of left index finger, hand and forearm to decrease edema and increase functional range of motion. Treatment/Session Summary:  Pt tolerating continued gentle progressive range of motion and strengthening. Pain and numbness report overall since last session has stayed at a 3/10. · Response to Treatment:  no adverse reaction. · Communication/Consultation:  OT role and plan of care  · Equipment provided today:  None today  · Recommendations/Intent for next treatment session: Next visit will focus on advancement towards more challenging exercises and reduction in the amount of assistance provided. \"    Total Treatment Billable Duration:  40 minutes   OT Patient Time In/Time Out  Time In: 1450  Time Out: 105 [facility], OT    Future Appointments   Date Time Provider Eric Hurtado   11/30/2020  1:00 PM [facility]

## 2020-12-02 ENCOUNTER — HOSPITAL ENCOUNTER (OUTPATIENT)
Dept: PHYSICAL THERAPY | Age: 45
Discharge: HOME | End: 2020-12-02
Payer: OTHER MISCELLANEOUS

## 2020-12-02 PROCEDURE — 97140 MANUAL THERAPY 1/> REGIONS: CPT

## 2020-12-02 PROCEDURE — 97110 THERAPEUTIC EXERCISES: CPT

## 2020-12-02 NOTE — PROGRESS NOTES
Piyush Hanna  : 1975  Primary: Leta Decker  Secondary:  [address]  [facility], [address] [place] [address], [address] [facility]  [phone]   [phone]         OUTPATIENT OCCUPATIONAL THERAPY: Daily Treatment Note 2020  Visit Count:  19    ICD-10: Treatment Diagnosis:   Muscle weakness (generalized) (M62.81)  Stiffness of left hand, not elsewhere classified (D82.495)  Precautions/Allergies:   Patient has no allergy information on record. TREATMENT PLAN:  Effective Dates: 2020 to 2020. Frequency/Duration: 1-2x/week for 90 Day(s)    Pre-treatment Symptoms/Complaints:  Pt reports his pain and shaking of the left hand is improving somewhat in intensity and frequency. Pain: Initial: Pain Intensity 1: 3  Pain Location 1: Finger (comment which one)(Left index)  Pain Orientation 1: Distal /10 Post Session:  2/10   Medications Last Reviewed:  2020    Updated Objective Findings:  see below:   ROM:    L MP: WFL  L PIP flexion: 73 degrees; 20=75 degrees; 20=91  L DIP flexion: 0 degrees' 20=45 degrees; 20=60     R DIP flexion: 75 degrees     Strength:    L : 40, 30, 40; 20=41 pounds; 20=58 pounds  R : 77, 80, 85; 20=119 pounds; 20=119 pounds     L tip: 1 lbs;  20=7; 20=8 pounds  L 3JC: 3 lbs;  20=12; 20=15 pounds     R tip: 12;  20=13; 20=17 pounds  R 3JC: 14;  20=22; 20=19 pounds   TREATMENT:     Therapeutic Exercise: (  35 minutes):  Exercises per grid below to improve mobility and strength. Required minimal visual and verbal cues to promote proper body posture and promote proper body mechanics. Progressed resistance, range, repetitions and complexity of movement as indicated.      Date:  20 Date:  10/7/20 Date:  10/13/20 Date:  10/22/20 Date:  20 Date:  20 Date:  20   Activity/Exercise          AROM  In fluidotherapy x 10 minutes    Full fist 2 x 10  Hook fist 2 x 10  Flat fist 1 x 10 In fluidotherapy x 10 minutes    Full fist 2 x 10  Hook fist 2 x 10  Flat fist 1 x 10 In fluidotherapy x 10 minutes    Full fist 2 x 10  Hook fist 2 x 10  Flat fist 2 x 10 In fluidotherapy x 10 minutes    Full fist 2 x 10  Hook fist 2 x 10  Flat fist 2 x 10 In fluidotherapy x 15 minutes    Full fist 2 x 10  Hook fist 2 x 10  Flat fist 2 x 10 In fluidotherapy x 10 minutes In fluidotherapy x 15 minutes   DIP Blocking Exercises 2 x 10 2 x 10 2 x 10 2 x 10 2 x 10 2 x 10 2 x 10   Resistive Putty          HEP          Hand helper 2 x 15  35 pounds 2 x 15  35 pounds 2 x 15  35 pounds 2 x 15  35 pounds 2 x 15  40 pounds 2 x 15  40 pounds 2 x 15  40 pounds     Resistive clothespin Black x 30 sponge pieces Black x 30 sponge pieces Black x 30 sponge pieces Black x 30 sponge pieces Black x 30 sponge pieces Black x 30 sponge pieces Black x 30 sponge pieces   Digiflex Index only   Yellow and green  1 x 10 each  and full fist green 2 x 10 Index only   Yellow and green  1 x 10 each  and full fist green 2 x 10 Index only   Yellow and green  2 x 10 each  and full fist green 2 x 10 Index only   Yellow and green  2 x 10 each  and full fist green 2 x 10 Index only   Yellow and green  2 x 10 each  and full fist green 2 x 10 Index only   Yellow and green  2 x 10 each  and full fist green 2 x 10 Index only   Yellow and green  2 x 10 each  and full fist green 2 x 10   PROM 2 x 10 with hold at end range of DIP flexion 2 x 10 with hold at end range of DIP flexion 2 x 10 with hold at end range of DIP flexion 2 x 10 with hold at end range of DIP flexion 2 x 10 with hold at end range of DIP flexion 2 x 10 with hold at end range of DIP flexion 2 x 10 with hold at end range of DIP flexion                                     Manual Therapy: (10 minutes): Soft tissue mobilization was utilized and necessary because of the patient's restricted motion of soft tissue.  Retrograde massage of left index finger, hand and forearm to decrease edema and increase functional range of motion. Treatment/Session Summary:  Pt tolerating continued gentle progressive range of motion and strengthening. Pain and numbness report overall since last session has stayed at a 3/10. Patient reports improvement in frequency and intensity of tremors in left hand. · Response to Treatment:  no adverse reaction. · Communication/Consultation:  OT role and plan of care  · Equipment provided today:  None today  · Recommendations/Intent for next treatment session: Next visit will focus on advancement towards more challenging exercises and reduction in the amount of assistance provided. \"    Total Treatment Billable Duration:  45 minutes   OT Patient Time In/Time Out  Time In: 1020  Time Out: [address], OT    Future Appointments   Date Time Provider Eric Hurtado   12/9/2020  1:00 PM Lisa Moffett OT [facility] DIANE   12/16/2020  1:00 PM Lisa Moffett, OT [facility]   12/22/2020  1:00 PM Lisa Moffett, OT [facility]   12/29/2020  1:00 PM Lisa Moffett OT [facility] BHARATHIE

## 2020-12-09 ENCOUNTER — HOSPITAL ENCOUNTER (OUTPATIENT)
Dept: PHYSICAL THERAPY | Age: 45
Discharge: HOME | End: 2020-12-09
Payer: OTHER MISCELLANEOUS

## 2020-12-09 PROCEDURE — 97140 MANUAL THERAPY 1/> REGIONS: CPT

## 2020-12-09 PROCEDURE — 97110 THERAPEUTIC EXERCISES: CPT

## 2020-12-09 NOTE — PROGRESS NOTES
Memo Arana  : 1975  Primary: Alcides Garcia [employer]  Secondary:  [address] at [place] [address] [facility], [address] [place] [address],[address], [place] [address].  [phone]   [phone]         OUTPATIENT OCCUPATIONAL THERAPY: Daily Treatment Note 2020  Visit Count:  20    ICD-10: Treatment Diagnosis:   Muscle weakness (generalized) (M62.81)  Stiffness of left hand, not elsewhere classified (P01.353)  Precautions/Allergies:   Patient has no allergy information on record. TREATMENT PLAN:  Effective Dates: 2020 to 2020. Frequency/Duration: 1-2x/week for 90 Day(s)    Pre-treatment Symptoms/Complaints:  Pt reports his pain and shaking of the left hand has not changed since last visit. Pain: Initial: Pain Intensity 1: 3  Pain Location 1: Finger (comment which one)(Left index)  Pain Orientation 1: 10 Post Session:  2/10   Medications Last Reviewed:  2020    Updated Objective Findings:  see below:   ROM:    L MP: WFL  L PIP flexion: 73 degrees; 20=75 degrees; 20=91  L DIP flexion: 0 degrees' 20=45 degrees; 20=60     R DIP flexion: 75 degrees     Strength:    L : 40, 30, 40; 20=41 pounds; 20=58 pounds  R : 77, 80, 85; 20=119 pounds; 20=119 pounds     L tip: 1 lbs;  20=7; 20=8 pounds  L 3JC: 3 lbs;  20=12; 20=15 pounds     R tip: 12;  20=13; 20=17 pounds  R 3JC: 14;  20=22; 20=19 pounds   TREATMENT:     Therapeutic Exercise: (  35 minutes):  Exercises per grid below to improve mobility and strength. Required minimal visual and verbal cues to promote proper body posture and promote proper body mechanics. Progressed resistance, range, repetitions and complexity of movement as indicated.      Date:  10/7/20 Date:  10/13/20 Date:  10/22/20 Date:  20 Date:  20 Date:  20 Date:  20   Activity/Exercise          AROM  In fluidotherapy x 10 minutes    Full fist 2 x 10  Hook fist 2 x 10  Flat fist 1 x 10 In fluidotherapy x 10 minutes    Full fist 2 x 10  Hook fist 2 x 10  Flat fist 2 x 10 In fluidotherapy x 10 minutes    Full fist 2 x 10  Hook fist 2 x 10  Flat fist 2 x 10 In fluidotherapy x 15 minutes    Full fist 2 x 10  Hook fist 2 x 10  Flat fist 2 x 10 In fluidotherapy x 10 minutes In fluidotherapy x 15 minutes In fluidotherapy x 15 minutes   DIP Blocking Exercises 2 x 10 2 x 10 2 x 10 2 x 10 2 x 10 2 x 10 2 x 10   Resistive Putty          HEP          Hand helper 2 x 15  35 pounds 2 x 15  35 pounds 2 x 15  35 pounds 2 x 15  40 pounds 2 x 15  40 pounds 2 x 15  40 pounds 2 x 15  40 pounds     Resistive clothespin Black x 30 sponge pieces Black x 30 sponge pieces Black x 30 sponge pieces Black x 30 sponge pieces Black x 30 sponge pieces Black x 30 sponge pieces Black x 30 sponge pieces   Digiflex Index only   Yellow and green  1 x 10 each  and full fist green 2 x 10 Index only   Yellow and green  2 x 10 each  and full fist green 2 x 10 Index only   Yellow and green  2 x 10 each  and full fist green 2 x 10 Index only   Yellow and green  2 x 10 each  and full fist green 2 x 10 Index only   Yellow and green  2 x 10 each  and full fist green 2 x 10 Index only   Yellow and green  2 x 10 each  and full fist green 2 x 10 Index only   Yellow and green  2 x 10 each  and full fist green 2 x 10   PROM 2 x 10 with hold at end range of DIP flexion 2 x 10 with hold at end range of DIP flexion 2 x 10 with hold at end range of DIP flexion 2 x 10 with hold at end range of DIP flexion 2 x 10 with hold at end range of DIP flexion 2 x 10 with hold at end range of DIP flexion 2 x 10 with hold at end range of DIP flexion                                     Manual Therapy: (10 minutes): Soft tissue mobilization was utilized and necessary because of the patient's restricted motion of soft tissue.  Retrograde massage of left index finger, hand and forearm to decrease edema and increase functional range of motion. Treatment/Session Summary:  Pt tolerating continued gentle progressive range of motion and strengthening. Pain and numbness report overall since last session has stayed at a 3/10. Re-assess at next visit. · Response to Treatment:  no adverse reaction. · Communication/Consultation:  OT role and plan of care  · Equipment provided today:  None today  · Recommendations/Intent for next treatment session: Next visit will focus on advancement towards more challenging exercises and reduction in the amount of assistance provided. \"    Total Treatment Billable Duration:  45 minutes   OT Patient Time In/Time Out  Time In: 1300  Time Out: [address] [employer], OT    Future Appointments   Date Time Provider Eric Hurtado   12/16/2020  1:00 PM Artie Yeung OT [facility] DIANE   12/22/2020  1:00 PM JESSICA Keane IRIS   12/29/2020  1:00 PM Artie Yeung OT [facility] DIANE

## 2020-12-16 ENCOUNTER — APPOINTMENT (OUTPATIENT)
Dept: PHYSICAL THERAPY | Age: 45
End: 2020-12-16
Payer: OTHER MISCELLANEOUS

## 2020-12-22 ENCOUNTER — HOSPITAL ENCOUNTER (OUTPATIENT)
Dept: PHYSICAL THERAPY | Age: 45
Discharge: HOME | End: 2020-12-22
Payer: OTHER MISCELLANEOUS

## 2020-12-22 PROCEDURE — 97110 THERAPEUTIC EXERCISES: CPT

## 2020-12-22 PROCEDURE — 97140 MANUAL THERAPY 1/> REGIONS: CPT

## 2020-12-22 NOTE — PROGRESS NOTES
Piyush Hanna  : 1975  Primary: Leta Bower  Secondary:  [address] at [place] [address] [facility], [address] [place] [address],[address], [place] [address].  [phone]   [phone]         OUTPATIENT OCCUPATIONAL THERAPY: Daily Treatment Note 2020  Visit Count:  21    ICD-10: Treatment Diagnosis:   Muscle weakness (generalized) (M62.81)  Stiffness of left hand, not elsewhere classified (E48.329)  Precautions/Allergies:   Patient has no allergy information on record. TREATMENT PLAN:  Effective Dates: 2020 to 2020. Frequency/Duration: 1-2x/week for 90 Day(s)    Pre-treatment Symptoms/Complaints:  Pt reports he has been out of work for a year. Pain: Initial: Pain Intensity 1: 4  Pain Location 1: Finger (comment which one)(left index finger)  Pain Orientation 1: Distal /10 Post Session:  2/10   Medications Last Reviewed:  2020    Updated Objective Findings:  see below:   ROM:    L MP: WFL  L PIP flexion: 73 degrees; 20=75 degrees; 20=91  L DIP flexion: 0 degrees' 20=45 degrees; 20=60     R DIP flexion: 75 degrees     Strength:    L : 40, 30, 40; 20=41 pounds; 20=58 pounds  R : 77, 80, 85; 20=119 pounds; 20=119 pounds     L tip: 1 lbs;  20=7; 20=8 pounds  L 3JC: 3 lbs;  20=12; 20=15 pounds     R tip: 12;  20=13; 20=17 pounds  R 3JC: 14;  20=22; 20=19 pounds   TREATMENT:     Therapeutic Exercise: (  35 minutes):  Exercises per grid below to improve mobility and strength. Required minimal visual and verbal cues to promote proper body posture and promote proper body mechanics. Progressed resistance, range, repetitions and complexity of movement as indicated.      Date:  10/7/20 Date:  10/13/20 Date:  10/22/20 Date:  20 Date:  20 Date:  20 Date:  20 Date:  20   Activity/Exercise           AROM  In fluidotherapy x 10 minutes    Full fist 2 x 10  Hook fist 2 x 10  Flat fist 1 x 10 In fluidotherapy x 10 minutes    Full fist 2 x 10  Hook fist 2 x 10  Flat fist 2 x 10 In fluidotherapy x 10 minutes    Full fist 2 x 10  Hook fist 2 x 10  Flat fist 2 x 10 In fluidotherapy x 15 minutes    Full fist 2 x 10  Hook fist 2 x 10  Flat fist 2 x 10 In fluidotherapy x 10 minutes In fluidotherapy x 15 minutes In fluidotherapy x 15 minutes In fluidotherapy x 15 minutes   DIP Blocking Exercises 2 x 10 2 x 10 2 x 10 2 x 10 2 x 10 2 x 10 2 x 10 2 x 10   Resistive Putty           HEP           Hand helper 2 x 15  35 pounds 2 x 15  35 pounds 2 x 15  35 pounds 2 x 15  40 pounds 2 x 15  40 pounds 2 x 15  40 pounds 2 x 15  40 pounds 2 x 15  40 pounds     Resistive clothespin Black x 30 sponge pieces Black x 30 sponge pieces Black x 30 sponge pieces Black x 30 sponge pieces Black x 30 sponge pieces Black x 30 sponge pieces Black x 30 sponge pieces    Digiflex Index only   Yellow and green  1 x 10 each  and full fist green 2 x 10 Index only   Yellow and green  2 x 10 each  and full fist green 2 x 10 Index only   Yellow and green  2 x 10 each  and full fist green 2 x 10 Index only   Yellow and green  2 x 10 each  and full fist green 2 x 10 Index only   Yellow and green  2 x 10 each  and full fist green 2 x 10 Index only   Yellow and green  2 x 10 each  and full fist green 2 x 10 Index only   Yellow and green  2 x 10 each  and full fist green 2 x 10 Index only  green  2 x 10 each  and full fist green 2 x 10   PROM 2 x 10 with hold at end range of DIP flexion 2 x 10 with hold at end range of DIP flexion 2 x 10 with hold at end range of DIP flexion 2 x 10 with hold at end range of DIP flexion 2 x 10 with hold at end range of DIP flexion 2 x 10 with hold at end range of DIP flexion 2 x 10 with hold at end range of DIP flexion 2 x 10 with hold at end range of DIP flexion   Composite finger flexion        x 30 pink sponge pieces                             Manual Therapy: (10 minutes):  Soft tissue mobilization was utilized and necessary because of the patient's restricted motion of soft tissue. Retrograde massage of left index finger, hand  to decrease edema and increase functional range of motion. Treatment/Session Summary:  Pt tolerating continued gentle progressive range of motion and strengthening. DIP is flexing 60°. Re-assess at next visit. · Response to Treatment:  no adverse reaction. · Communication/Consultation:  OT role and plan of care  · Equipment provided today:  None today  · Recommendations/Intent for next treatment session: Next visit will focus on advancement towards more challenging exercises and reduction in the amount of assistance provided. \"    Total Treatment Billable Duration:  45 minutes   OT Patient Time In/Time Out  Time In: 1300  Time Out: 1345  ELPIDIO Irby/L    Future Appointments   Date Time Provider Eric Hurtado   12/29/2020  1:00 PM [facility]

## 2020-12-29 ENCOUNTER — HOSPITAL ENCOUNTER (OUTPATIENT)
Dept: PHYSICAL THERAPY | Age: 45
Discharge: HOME | End: 2020-12-29
Payer: OTHER MISCELLANEOUS

## 2020-12-29 NOTE — PROGRESS NOTES
Nohemy Patel  : 1975  Primary: Tj Bower  Secondary:  [address] at [address] [facility]  [address] , [place] [address], [facility] [address]  [phone]   [phone]       Mr. Miriam Valdez cancelled today's appointment.     Salud Lorenzana, OT

## 2021-01-06 ENCOUNTER — HOSPITAL ENCOUNTER (OUTPATIENT)
Dept: PHYSICAL THERAPY | Age: 46
Discharge: HOME | End: 2021-01-06
Payer: COMMERCIAL

## 2021-01-06 PROCEDURE — 97140 MANUAL THERAPY 1/> REGIONS: CPT

## 2021-01-06 PROCEDURE — 97110 THERAPEUTIC EXERCISES: CPT

## 2021-01-06 NOTE — THERAPY RECERTIFICATION
Omar Auguste  : 1975  Primary: Adonis Deckerrisk  Secondary:  [address] at [place] [address] [facility], [address] [place] [address],[address], [facility] [address]  [phone]   [phone]           OUTPATIENT OCCUPATIONAL [email or web address] 95/3/14   ICD-10: Treatment Diagnosis:   Muscle weakness (generalized) (M62.81)  Stiffness of left hand, not elsewhere classified (C28.963)  Precautions/Allergies:   Patient has no allergy information on record. TREATMENT PLAN:  Effective Dates: 2020 to 3/9/21. Frequency/Duration: 1-2x/week for 90 Day(s) MEDICAL/REFERRING DIAGNOSIS:  Displaced fracture of distal phalanx of unspecified finger, initial encounter for open fracture [S62.639B]   DATE OF ONSET:   REFERRING PHYSICIAN: Charu Andrade MD MD Orders: eval and treat  Return MD Appointment: Unknown. 20: Mr. Katarina Miranda is continuing to make good progress toward his goals with improving range of motion and strength of the left upper extremity. He continues to complain of shakiness and shooting, tingling pain in the left index finger, although these reports have decreased in intensity and frequency. Feel he may continue to benefit from skilled occupational therapy to maximize functional use of left hand in activities of daily living.   20: Mr. Katarina Miranda is making good progress toward his goals with improving range of motion and strength of the left upper extremity. He continues to complain of shakiness and shooting, tingling pain in the left index finger. Feel he may continue to benefit from skilled occupational therapy to maximize functional use of left hand in activities of daily living.     20: Mr. Montoya's care was interrupted by the [place] pandemic. He has completed 3 occupational therapy visits and is making good progress toward his goals. His DIP range of motion has improved significantly since evaluation.  He continues to complain of sharp, shooting intermittent pain in the left index finger. Noted shaking of hand during therapeutic exercise. Feel he may continue to benefit from skilled occupational therapy to maximize functional use of left hand in activities of daily living. INITIAL ASSESSMENT:  Mr. Alfredo Lopez presents for displaced fracture of L DIP s/p laceration to DIP in work incident. Upon evaluation, pt presents with decreased DIP flexion AROM as well as limited tip/3JC pinch and  strength. At this time, Karina Mercer would benefit from skilled OT services to maximize functional use of L hand during ADLs, IADLs, and work tasks. PROBLEM LIST (Impacting functional limitations):  1. Decreased Strength  2. Decreased ADL/Functional Activities  3. Increased Pain  4. Decreased Activity Tolerance  5. Decreased Flexibility/Joint Mobility  6. Decreased [place] with Home Exercise Program INTERVENTIONS PLANNED: (Treatment may consist of any combination of the following)  1. Activities of daily living training  2. Adaptive equipment training  3. Donning&doffing training  4. Manual therapy training  5. Modalities  6. Neuromuscular re-eduation  7. Re-evaluation  8. Splinting  9. Therapeutic activity  10. Therapeutic exercise     GOALS: (Goals have been discussed and agreed upon with patient.)  Short-Term Functional Goals: Time Frame: 45 days  1. Patient will be independent with HEP within 2 visits of initial evaluation in order to maintain gains achieved during therapy. Met  Discharge Goals: Time Frame: 90 days  1. Patient will report increased independence with activities of daily living, evidenced by a score of < 30 on the Quick Dash. Continue to address  2. Patient will demonstrate improved left  strength by 10 lbs to increase independence with completing heavy household chores. Met  3. Patient will improve left DIP flexion AROM by 20 degrees for increased ability to complete feeding tasks. Continue to address  4.  Patient will improve L tip pinch by 5 degrees to for increased ability to open tight or new jars. Continue to address    OUTCOME MEASURE:   Tool Used: Disabilities of the Arm, Shoulder and Hand (DASH) Questionnaire - Quick Version  Score:  Initial: 46/55  Most Recent: X/55 (Date: -- )   Interpretation of Score: The DASH is designed to measure the activities of daily living in person's with upper extremity dysfunction or pain. Each section is scored on a 1-5 scale, 5 representing the greatest disability. The scores of each section are added together for a total score of 55. MEDICAL NECESSITY:   · Patient demonstrates good rehab potential due to higher previous functional level. REASON FOR SERVICES/OTHER COMMENTS:  · Patient continues to require skilled intervention due to medical complications and patient unable to attend/participate in therapy as expected. Total Duration:  OT Patient Time In/Time Out  Time In: 1110  Time Out: 1150    Rehabilitation Potential For Stated Goals: Good  Regarding Leonor Montoya's therapy, I certify that the treatment plan above will be carried out by a therapist or under their direction. Thank you for this referral,  Sarah Deluca OT     Referring Physician Signature: Rajinder Hoffman MD _______________________________ Date _____________     PAIN/SUBJECTIVE:   Initial: Pain Intensity 1: 0  /10 Post Session:  0/10   OCCUPATIONAL PROFILE & HISTORY:   History of Injury/Illness (Reason for Referral): Work incident resulting in deep laceration to the bone of his L index DIP resulting in limited DIP flexion as well as decreased  and pinch strength. Reports random occurrence of numbness, pain and swelling. Notes pain to be 10/10 with activity. Past Medical History/Comorbidities:   Mr. Cresencio Rizzo  has no past medical history on file. Mr. Cresencio Rizzo  has no past surgical history on file.   Social History/Living Environment:      Prior Level of Function/Work/Activity:  Independent with ADLs, IADLs, and work tasks. Dominant Side:         RIGHT  Personal Factors:          Sex:  male        Age:  39 y.o. Ambulatory/Rehab Services H2 Model Falls Risk Assessment   Risk Factors:       (1)  Gender [Male] Ability to Rise from Chair:       (0)  Ability to rise in a single movement   Falls Prevention Plan:       No modifications necessary   Total: (5 or greater = High Risk): 0   ©2010 [facility] of [employer]. All Rights Reserved. [facility] [employer] Patent #[id number]. Federal Law prohibits the replication, distribution or use without written permission from [facility] [employer]   Current Medications:     No current outpatient medications on file.    Date Last Reviewed:  1/6/2021     Complexity Level: Brief history (0):  LOW COMPLEXITY   ASSESSMENT OF OCCUPATIONAL PERFORMANCE:   ROM:    L MP: WFL  L PIP flexion: 73 degrees; 6/17/20=75 degrees; 8/24/20=91  L DIP flexion: 0 degrees' 6/17/20=45 degrees; 8/24/20=60; 12/22/20=60 degrees     R DIP flexion: 75 degrees     Strength:    L : 40, 30, 40; 6/17/20=41 pounds; 8/24/20=58 pounds  R : 77, 80, 85; 6/17/20=119 pounds; 8/24/20=119 pounds     L tip: 1 lbs;  6/17/20=7; 8/24/20=8 pounds  L 3JC: 3 lbs;  6/17/20=12; 8/24/20=15 pounds     R tip: 12;  6/17/20=13; 8/24/20=17 pounds  R 3JC: 14;  6/17/20=22; 8/24/20=19 pounds    Activities of Daily Living:           Basic ADLs (From Assessment) Complex ADLs (From Assessment)         Grooming/Bathing/Dressing Activities of Daily Living

## 2021-01-06 NOTE — PROGRESS NOTES
Omar Auguste  : 1975  Primary: Adonis Bower  Secondary:  [address] at [place] [address] [facility], [address] [place] [address],[address], [address] [facility]  [phone]   [phone]         OUTPATIENT OCCUPATIONAL THERAPY: Daily Treatment Note 2021  Visit Count:  22    ICD-10: Treatment Diagnosis:   Muscle weakness (generalized) (M62.81)  Stiffness of left hand, not elsewhere classified (C61.306)  Precautions/Allergies:   Patient has no allergy information on record. TREATMENT PLAN:  Effective Dates: 2020 to 3/9/21. Frequency/Duration: 1-2x/week for 90 Day(s)    Pre-treatment Symptoms/Complaints:  Pt reports he may be returning to work soon but hopes to be able to continue therapy. Pain: Initial: Pain Intensity 1: 0 /10 Post Session:  0/10   Medications Last Reviewed:  2021    Updated Objective Findings:  see below:   ROM:    L MP: WFL  L PIP flexion: 73 degrees; 20=75 degrees; 20=91  L DIP flexion: 0 degrees' 20=45 degrees; 20=60; 20=60     R DIP flexion: 75 degrees     Strength:    L : 40, 30, 40; 20=41 pounds; 20=58 pounds  R : 77, 80, 85; 20=119 pounds; 20=119 pounds     L tip: 1 lbs;  20=7; 20=8 pounds  L 3JC: 3 lbs;  20=12; 20=15 pounds     R tip: 12;  20=13; 20=17 pounds  R 3JC: 14;  20=22; 20=19 pounds   TREATMENT:     Therapeutic Exercise: (  30 minutes):  Exercises per grid below to improve mobility and strength. Required minimal visual and verbal cues to promote proper body posture and promote proper body mechanics. Progressed resistance, range, repetitions and complexity of movement as indicated.      Date:  10/13/20 Date:  10/22/20 Date:  20 Date:  20 Date:  20 Date:  20 Date:  20 Date:  21   Activity/Exercise           AROM  In fluidotherapy x 10 minutes    Full fist 2 x 10  Hook fist 2 x 10  Flat fist 2 x 10 In fluidotherapy x 10 minutes    Full fist 2 x 10  Hook fist 2 x 10  Flat fist 2 x 10 In fluidotherapy x 15 minutes    Full fist 2 x 10  Hook fist 2 x 10  Flat fist 2 x 10 In fluidotherapy x 10 minutes In fluidotherapy x 15 minutes In fluidotherapy x 15 minutes In fluidotherapy x 15 minutes In fluidotherapy x 10 minutes   DIP Blocking Exercises 2 x 10 2 x 10 2 x 10 2 x 10 2 x 10 2 x 10 2 x 10 2 x 10   Resistive Putty           HEP           Hand helper 2 x 15  35 pounds 2 x 15  35 pounds 2 x 15  40 pounds 2 x 15  40 pounds 2 x 15  40 pounds 2 x 15  40 pounds 2 x 15  40 pounds 2 x 15  40 pounds     Resistive clothespin Black x 30 sponge pieces Black x 30 sponge pieces Black x 30 sponge pieces Black x 30 sponge pieces Black x 30 sponge pieces Black x 30 sponge pieces  Black x 30 sponge pieces   Digiflex Index only   Yellow and green  2 x 10 each  and full fist green 2 x 10 Index only   Yellow and green  2 x 10 each  and full fist green 2 x 10 Index only   Yellow and green  2 x 10 each  and full fist green 2 x 10 Index only   Yellow and green  2 x 10 each  and full fist green 2 x 10 Index only   Yellow and green  2 x 10 each  and full fist green 2 x 10 Index only   Yellow and green  2 x 10 each  and full fist green 2 x 10 Index only  green  2 x 10 each  and full fist green 2 x 10 Index only   Yellow and green  2 x 10 each  and full fist green 2 x 10   PROM 2 x 10 with hold at end range of DIP flexion 2 x 10 with hold at end range of DIP flexion 2 x 10 with hold at end range of DIP flexion 2 x 10 with hold at end range of DIP flexion 2 x 10 with hold at end range of DIP flexion 2 x 10 with hold at end range of DIP flexion 2 x 10 with hold at end range of DIP flexion 2 x 10 with hold at end range of DIP flexion   Composite finger flexion       x 30 pink sponge pieces                              Manual Therapy: (10 minutes): Soft tissue mobilization was utilized and necessary because of the patient's restricted motion of soft tissue. Retrograde massage of left index finger, hand  to decrease edema and increase functional range of motion. Treatment/Session Summary:  Pt tolerating continued gentle progressive range of motion and strengthening without complaint of pain today; reports some continued shaking with increased use of left hand at times. · Response to Treatment:  no adverse reaction. · Communication/Consultation:  OT role and plan of care  · Equipment provided today:  None today  · Recommendations/Intent for next treatment session: Next visit will focus on advancement towards more challenging exercises and reduction in the amount of assistance provided. \"    Total Treatment Billable Duration:  40 minutes   OT Patient Time In/Time Out  Time In: 1110  Time Out: [facility], OT    Future Appointments   Date Time Provider Eric Hurtado   1/12/2021 11:00 AM [facility]   1/21/2021 11:00 AM JESSICA Metz [facility]

## 2021-01-12 ENCOUNTER — HOSPITAL ENCOUNTER (OUTPATIENT)
Dept: PHYSICAL THERAPY | Age: 46
Discharge: HOME | End: 2021-01-12
Payer: COMMERCIAL

## 2021-01-12 PROCEDURE — 97140 MANUAL THERAPY 1/> REGIONS: CPT

## 2021-01-12 PROCEDURE — 97110 THERAPEUTIC EXERCISES: CPT

## 2021-01-12 NOTE — PROGRESS NOTES
Anni Gui  : 1975  Primary: Lauren Deckerrisk  Secondary:  [address] at [place] [address] [facility], [address] [place] [address],[address], [facility] [address]  [phone]   [phone]         OUTPATIENT OCCUPATIONAL THERAPY: Daily Treatment Note 2021  Visit Count:  23    ICD-10: Treatment Diagnosis:   Muscle weakness (generalized) (M62.81)  Stiffness of left hand, not elsewhere classified (C57.816)  Precautions/Allergies:   Patient has no allergy information on record. TREATMENT PLAN:  Effective Dates: 2020 to 3/9/21. Frequency/Duration: 1-2x/week for 90 Day(s)    Pre-treatment Symptoms/Complaints:  Pt reports he is mildly concerned about the shaking and numbness in the left index finger. Pain: Initial: Pain Intensity 1: 0 /10 Post Session:  0/10   Medications Last Reviewed:  2021    Updated Objective Findings:  see below:   ROM:    L MP: WFL  L PIP flexion: 73 degrees; 20=75 degrees; 20=91  L DIP flexion: 0 degrees' 20=45 degrees; 20=60; 20=60     R DIP flexion: 75 degrees     Strength:    L : 40, 30, 40; 20=41 pounds; 20=58 pounds  R : 77, 80, 85; 20=119 pounds; 20=119 pounds     L tip: 1 lbs;  20=7; 20=8 pounds  L 3JC: 3 lbs;  20=12; 20=15 pounds     R tip: 12;  20=13; 20=17 pounds  R 3JC: 14;  20=22; 20=19 pounds   TREATMENT:     Therapeutic Exercise: (  35 minutes):  Exercises per grid below to improve mobility and strength. Required minimal visual and verbal cues to promote proper body posture and promote proper body mechanics. Progressed resistance, range, repetitions and complexity of movement as indicated.      Date:  10/22/20 Date:  20 Date:  20 Date:  20 Date:  20 Date:  20 Date:  21 Date:  21   Activity/Exercise           AROM  In fluidotherapy x 10 minutes    Full fist 2 x 10  Hook fist 2 x 10  Flat fist 2 x 10 In fluidotherapy x 15 minutes    Full fist 2 x 10  Hook fist 2 x 10  Flat fist 2 x 10 In fluidotherapy x 10 minutes In fluidotherapy x 15 minutes In fluidotherapy x 15 minutes In fluidotherapy x 15 minutes In fluidotherapy x 10 minutes In fluidotherapy x 12 minutes   DIP Blocking Exercises 2 x 10 2 x 10 2 x 10 2 x 10 2 x 10 2 x 10 2 x 10 2 x 10   Resistive Putty           HEP           Hand helper 2 x 15  35 pounds 2 x 15  40 pounds 2 x 15  40 pounds 2 x 15  40 pounds 2 x 15  40 pounds 2 x 15  40 pounds 2 x 15  40 pounds 2 x 15  40 pounds     Resistive clothespin Black x 30 sponge pieces Black x 30 sponge pieces Black x 30 sponge pieces Black x 30 sponge pieces Black x 30 sponge pieces  Black x 30 sponge pieces Black x 30 sponge pieces   Digiflex Index only   Yellow and green  2 x 10 each  and full fist green 2 x 10 Index only   Yellow and green  2 x 10 each  and full fist green 2 x 10 Index only   Yellow and green  2 x 10 each  and full fist green 2 x 10 Index only   Yellow and green  2 x 10 each  and full fist green 2 x 10 Index only   Yellow and green  2 x 10 each  and full fist green 2 x 10 Index only  green  2 x 10 each  and full fist green 2 x 10 Index only   Yellow and green  2 x 10 each  and full fist green 2 x 10 Index only   Yellow and green  2 x 10 each  and full fist green 2 x 10   PROM 2 x 10 with hold at end range of DIP flexion 2 x 10 with hold at end range of DIP flexion 2 x 10 with hold at end range of DIP flexion 2 x 10 with hold at end range of DIP flexion 2 x 10 with hold at end range of DIP flexion 2 x 10 with hold at end range of DIP flexion 2 x 10 with hold at end range of DIP flexion 2 x 10 with hold at end range of DIP flexion   Composite finger flexion      x 30 pink sponge pieces                               Manual Therapy: (10 minutes): Soft tissue mobilization was utilized and necessary because of the patient's restricted motion of soft tissue.  Retrograde massage of left index finger, hand  to decrease edema and increase functional range of motion. Treatment/Session Summary:  Pt tolerating continued gentle progressive range of motion and strengthening without complaint of pain today; reports some continued shaking with increased use of left hand at times and numbness in left distal index finger. · Response to Treatment:  no adverse reaction. · Communication/Consultation:  OT role and plan of care  · Equipment provided today:  None today  · Recommendations/Intent for next treatment session: Next visit will focus on advancement towards more challenging exercises and reduction in the amount of assistance provided. \"    Total Treatment Billable Duration:  45 minutes   OT Patient Time In/Time Out  Time In: 1110  Time Out: JESSICA Nicholas    Future Appointments   Date Time Provider Eric Hurtado   1/21/2021 11:00 AM [facility]

## 2021-01-21 ENCOUNTER — HOSPITAL ENCOUNTER (OUTPATIENT)
Dept: PHYSICAL THERAPY | Age: 46
Discharge: HOME | End: 2021-01-21
Payer: COMMERCIAL

## 2021-01-21 PROCEDURE — 97110 THERAPEUTIC EXERCISES: CPT

## 2021-01-21 PROCEDURE — 97140 MANUAL THERAPY 1/> REGIONS: CPT

## 2021-01-21 NOTE — PROGRESS NOTES
Temi Martínez  : 1975  Primary: Ahmetóscarwanda Deckerriskamilah  Secondary:  [address] [place] [address],[address], [address] [facility]  [phone]   [phone]           OUTPATIENT OCCUPATIONAL THERAPY:Progress Report 2021   ICD-10: Treatment Diagnosis:   Muscle weakness (generalized) (M62.81)  Stiffness of left hand, not elsewhere classified (L15.356)  Precautions/Allergies:   Patient has no allergy information on record. TREATMENT PLAN:  Effective Dates: 2020 to 3/9/21. Frequency/Duration: 1-2x/week for 90 Day(s) MEDICAL/REFERRING DIAGNOSIS:  Displaced fracture of distal phalanx of unspecified finger, initial encounter for open fracture [S62.639B]   DATE OF ONSET:   REFERRING PHYSICIAN: Abiola Valdes MD MD Orders: eval and treat  Return MD Appointment: Unknown.     21: Mr. Meghana Lock is continuing to make good progress toward his goals; his range of motion and strength of the left upper extremity have not changed significantly except for his pinch strength since last assessment. He continues to complain of shakiness and shooting, tingling pain in the left index finger, although these reports have decreased in intensity and frequency. Patient reports an increase in pain over the past few days for unknown reasons. Feel he may continue to benefit from skilled occupational therapy to maximize functional use of left hand in activities of daily living. 20: Mr. Meghana Lock is continuing to make good progress toward his goals with improving range of motion and strength of the left upper extremity. He continues to complain of shakiness and shooting, tingling pain in the left index finger, although these reports have decreased in intensity and frequency.  Feel he may continue to benefit from skilled occupational therapy to maximize functional use of left hand in activities of daily living.   20: Mr. Meghana Lock is making good progress toward his goals with improving range of motion and strength of the left upper extremity. He continues to complain of shakiness and shooting, tingling pain in the left index finger. Feel he may continue to benefit from skilled occupational therapy to maximize functional use of left hand in activities of daily living.     6/17/20: Mr. Montoya's care was interrupted by the [place] pandemic. He has completed 3 occupational therapy visits and is making good progress toward his goals. His DIP range of motion has improved significantly since evaluation. He continues to complain of sharp, shooting intermittent pain in the left index finger. Noted shaking of hand during therapeutic exercise. Feel he may continue to benefit from skilled occupational therapy to maximize functional use of left hand in activities of daily living. INITIAL ASSESSMENT:  Mr. Latanya Meléndez presents for displaced fracture of L DIP s/p laceration to DIP in work incident. Upon evaluation, pt presents with decreased DIP flexion AROM as well as limited tip/3JC pinch and  strength. At this time, Rosalie Gant would benefit from skilled OT services to maximize functional use of L hand during ADLs, IADLs, and work tasks. PROBLEM LIST (Impacting functional limitations):  1. Decreased Strength  2. Decreased ADL/Functional Activities  3. Increased Pain  4. Decreased Activity Tolerance  5. Decreased Flexibility/Joint Mobility  6. Decreased [place] with Home Exercise Program INTERVENTIONS PLANNED: (Treatment may consist of any combination of the following)  1. Activities of daily living training  2. Adaptive equipment training  3. Donning&doffing training  4. Manual therapy training  5. Modalities  6. Neuromuscular re-eduation  7. Re-evaluation  8. Splinting  9. Therapeutic activity  10. Therapeutic exercise     GOALS: (Goals have been discussed and agreed upon with patient.)  Short-Term Functional Goals: Time Frame: 45 days  1.  Patient will be independent with HEP within 2 visits of initial evaluation in order to maintain gains achieved during therapy. Met  Discharge Goals: Time Frame: 90 days  1. Patient will report increased independence with activities of daily living, evidenced by a score of < 30 on the Quick Dash. Continue to address  2. Patient will demonstrate improved left  strength by 10 lbs to increase independence with completing heavy household chores. Met  3. Patient will improve left DIP flexion AROM by 20 degrees for increased ability to complete feeding tasks. Continue to address  4. Patient will improve L tip pinch by 5 degrees to for increased ability to open tight or new jars. Met    OUTCOME MEASURE:   Tool Used: Disabilities of the Arm, Shoulder and Hand (DASH) Questionnaire - Quick Version  Score:  Initial: 46/55  Most Recent: X/55 (Date: -- )   Interpretation of Score: The DASH is designed to measure the activities of daily living in person's with upper extremity dysfunction or pain. Each section is scored on a 1-5 scale, 5 representing the greatest disability. The scores of each section are added together for a total score of 55. MEDICAL NECESSITY:   · Patient demonstrates good rehab potential due to higher previous functional level. REASON FOR SERVICES/OTHER COMMENTS:  · Patient continues to require skilled intervention due to medical complications and patient unable to attend/participate in therapy as expected. Total Duration:  OT Patient Time In/Time Out  Time In: 1105  Time Out: 1150    Rehabilitation Potential For Stated Goals: Good  Regarding Lenore Montoya's therapy, I certify that the treatment plan above will be carried out by a therapist or under their direction. Thank you for this referral,  Kala Diane, OT     Referring Physician Signature: Ankit Dejesus MD No Signature is Required for this note.      PAIN/SUBJECTIVE:   Initial: Pain Intensity 1: 5  Pain Location 1: Finger (comment which one)(Left index)  Pain Orientation 1: Distal  /10 Post Session:  0/10   OCCUPATIONAL PROFILE & HISTORY:   History of Injury/Illness (Reason for Referral): Work incident resulting in deep laceration to the bone of his L index DIP resulting in limited DIP flexion as well as decreased  and pinch strength. Reports random occurrence of numbness, pain and swelling. Notes pain to be 10/10 with activity. Past Medical History/Comorbidities:   Mr. Nicki Mcburney  has no past medical history on file. Mr. Nicki Mcburney  has no past surgical history on file. Social History/Living Environment:      Prior Level of Function/Work/Activity:  Independent with ADLs, IADLs, and work tasks. Dominant Side:         RIGHT  Personal Factors:          Sex:  male        Age:  39 y.o. Ambulatory/Rehab Services H2 Model Falls Risk Assessment   Risk Factors:       (1)  Gender [Male] Ability to Rise from Chair:       (0)  Ability to rise in a single movement   Falls Prevention Plan:       No modifications necessary   Total: (5 or greater = High Risk): 0   ©2010 [facility] [email or web address]. All Rights Reserved. [facility] Patent #[id number]. Federal Law prohibits the replication, distribution or use without written permission from [facility] [employer]   Current Medications:     No current outpatient medications on file.    Date Last Reviewed:  1/21/2021     Complexity Level: Brief history (0):  LOW COMPLEXITY   ASSESSMENT OF OCCUPATIONAL PERFORMANCE:   ROM:    L MP: WFL  L PIP flexion: 73 degrees; 6/17/20=75 degrees; 8/24/20=91; 1/21/21=91 degrees  L DIP flexion: 0 degrees' 6/17/20=45 degrees; 8/24/20=60; 12/22/20=60 degrees; 1/21/21=60 degrees     R DIP flexion: 75 degrees     Strength:    L : 40, 30, 40; 6/17/20=41 pounds; 8/24/20=58 pounds; 1/21/21=58 pounds  R : 77, 80, 85; 6/17/20=119 pounds; 8/24/20=119 pounds; 1/21/21=100 pounds     L tip: 1 lbs;  6/17/20=7; 8/24/20=8 pounds; 1/21/21=11 pounds  L 3JC: 3 lbs;  6/17/20=12; 8/24/20=15 pounds; 1/21/21=14 pounds     R tip: 12;  6/17/20=13; 8/24/20=17 pounds; 1/21/21=22 pounds  R 3JC: 14;  6/17/20=22; 8/24/20=19 pounds; 1/21/21=22 pounds    Activities of Daily Living:           Basic ADLs (From Assessment) Complex ADLs (From Assessment)         Grooming/Bathing/Dressing Activities of Daily Living

## 2021-01-21 NOTE — PROGRESS NOTES
Puneet Carrasco  : 1975  Primary: Monique Chavezkamilah  Secondary:  [address]  at [place] [id number] [facility], [address] [place] [address],[address], [address] [facility]  [phone]   [phone]         OUTPATIENT OCCUPATIONAL THERAPY: Daily Treatment Note 2021  Visit Count:  24    ICD-10: Treatment Diagnosis:   Muscle weakness (generalized) (M62.81)  Stiffness of left hand, not elsewhere classified (X46.203)  Precautions/Allergies:   Patient has no allergy information on record. TREATMENT PLAN:  Effective Dates: 2020 to 3/9/21. Frequency/Duration: 1-2x/week for 90 Day(s)    Pre-treatment Symptoms/Complaints:  Pt reports an increase in pain in left index finger over the past few days with unknown reasons. Pain: Initial: Pain Intensity 1: 5  Pain Location 1: Finger (comment which one)(Left index)  Pain Orientation 1: Distal /10 Post Session:  0/10   Medications Last Reviewed:  2021    Updated Objective Findings:  see below:   ROM:    L MP: WFL  L PIP flexion: 73 degrees; 20=75 degrees; 20=91; 21=91 degrees  L DIP flexion: 0 degrees' 20=45 degrees; 20=60; 20=60 degrees; 21=60 degrees     R DIP flexion: 75 degrees     Strength:    L : 40, 30, 40; 20=41 pounds; 20=58 pounds; 21=58 pounds  R : 77, 80, 85; 20=119 pounds; 20=119 pounds; 21=100 pounds     L tip: 1 lbs;  20=7; 20=8 pounds; 21=11 pounds  L 3JC: 3 lbs;  20=12; 20=15 pounds; 21=14 pounds     R tip: 12;  20=13; 20=17 pounds; 21=22 pounds  R 3JC: 14;  20=22; 20=19 pounds; 21=22 pounds   TREATMENT:     Therapeutic Exercise: (  35 minutes):  Exercises per grid below to improve mobility and strength. Required minimal visual and verbal cues to promote proper body posture and promote proper body mechanics. Progressed resistance, range, repetitions and complexity of movement as indicated.      Date:  20 Date:  11/19/20 Date:  12/2/20 Date:  12/9/20 Date:  12/22/20 Date:  1/6/21 Date:  1/12/21 Date:  1/21/21   Activity/Exercise           AROM  In fluidotherapy x 15 minutes    Full fist 2 x 10  Hook fist 2 x 10  Flat fist 2 x 10 In fluidotherapy x 10 minutes In fluidotherapy x 15 minutes In fluidotherapy x 15 minutes In fluidotherapy x 15 minutes In fluidotherapy x 10 minutes In fluidotherapy x 12 minutes In fluidotherapy x 15 minutes   DIP Blocking Exercises 2 x 10 2 x 10 2 x 10 2 x 10 2 x 10 2 x 10 2 x 10 2 x 10   Resistive Putty           HEP           Hand helper 2 x 15  40 pounds 2 x 15  40 pounds 2 x 15  40 pounds 2 x 15  40 pounds 2 x 15  40 pounds 2 x 15  40 pounds 2 x 15  40 pounds 2 x 15  40 pounds     Resistive clothespin Black x 30 sponge pieces Black x 30 sponge pieces Black x 30 sponge pieces Black x 30 sponge pieces  Black x 30 sponge pieces Black x 30 sponge pieces Black x 30 sponge pieces   Digiflex Index only   Yellow and green  2 x 10 each  and full fist green 2 x 10 Index only   Yellow and green  2 x 10 each  and full fist green 2 x 10 Index only   Yellow and green  2 x 10 each  and full fist green 2 x 10 Index only   Yellow and green  2 x 10 each  and full fist green 2 x 10 Index only  green  2 x 10 each  and full fist green 2 x 10 Index only   Yellow and green  2 x 10 each  and full fist green 2 x 10 Index only   Yellow and green  2 x 10 each  and full fist green 2 x 10 Index only   Yellow and green  2 x 10 each  and full fist green 2 x 10   PROM 2 x 10 with hold at end range of DIP flexion 2 x 10 with hold at end range of DIP flexion 2 x 10 with hold at end range of DIP flexion 2 x 10 with hold at end range of DIP flexion 2 x 10 with hold at end range of DIP flexion 2 x 10 with hold at end range of DIP flexion 2 x 10 with hold at end range of DIP flexion 2 x 10 with hold at end range of DIP flexion   Composite finger flexion     x 30 pink sponge pieces                                Manual Therapy: (10 minutes): Soft tissue mobilization was utilized and necessary because of the patient's restricted motion of soft tissue. Retrograde massage of left index finger, hand  to decrease edema and increase functional range of motion. Treatment/Session Summary:  Pt tolerating continued gentle progressive range of motion and strengthening with increased complaint of pain today; reports some continued shaking with increased use of left hand at times and numbness in left distal index finger. · Response to Treatment:  no adverse reaction. · Communication/Consultation:  OT role and plan of care  · Equipment provided today:  None today  · Recommendations/Intent for next treatment session: Next visit will focus on advancement towards more challenging exercises and reduction in the amount of assistance provided. \"    Total Treatment Billable Duration:  45 minutes   OT Patient Time In/Time Out  Time In: 1105  Time Out: 800 [facility], OT    No future appointments.

## 2021-07-02 NOTE — THERAPY DISCHARGE
Carol Andrew  : 1975  Primary: Alise Bower  Secondary:  [address] at [place] [id number] [facility], [address] [place] [address],[address], [place] [address].  [phone]   [phone]           OUTPATIENT OCCUPATIONAL THERAPY:Discontinuation Summary 21   ICD-10: Treatment Diagnosis:   Muscle weakness (generalized) (M62.81)  Stiffness of left hand, not elsewhere classified (U60.428)  Precautions/Allergies:   Patient has no allergy information on record. TREATMENT PLAN:  Effective Dates: 2020 to 3/9/21. Frequency/Duration: 1-2x/week for 90 Day(s) MEDICAL/REFERRING DIAGNOSIS:  Displaced fracture of distal phalanx of unspecified finger, initial encounter for open fracture [S62.639B]   DATE OF ONSET:   REFERRING PHYSICIAN: Carmen Torre MD MD Orders: eval and treat  Return MD Appointment: Unknown. 21: Mr. Nicki Mcburney attended 24 out of 33 scheduled OT visits. He did not return to therapy after those 24 visits. His plan of care will be discontinued; please re-consult if future needs arise. Thank you for the opportunity to participate in his care.   21: Mr. Nicki Mcburney is continuing to make good progress toward his goals; his range of motion and strength of the left upper extremity have not changed significantly except for his pinch strength since last assessment. He continues to complain of shakiness and shooting, tingling pain in the left index finger, although these reports have decreased in intensity and frequency. Patient reports an increase in pain over the past few days for unknown reasons. Feel he may continue to benefit from skilled occupational therapy to maximize functional use of left hand in activities of daily living. 20: Mr. Nicki Mcburney is continuing to make good progress toward his goals with improving range of motion and strength of the left upper extremity.  He continues to complain of shakiness and shooting, tingling pain in the left index finger, although these reports have decreased in intensity and frequency. Feel he may continue to benefit from skilled occupational therapy to maximize functional use of left hand in activities of daily living.   9/21/20: Mr. Montoya is making good progress toward his goals with improving range of motion and strength of the left upper extremity. He continues to complain of shakiness and shooting, tingling pain in the left index finger. Feel he may continue to benefit from skilled occupational therapy to maximize functional use of left hand in activities of daily living.     6/17/20: Mr. Montoya's care was interrupted by the COVID pandemic. He has completed 3 occupational therapy visits and is making good progress toward his goals. His DIP range of motion has improved significantly since evaluation. He continues to complain of sharp, shooting intermittent pain in the left index finger. Noted shaking of hand during therapeutic exercise. Feel he may continue to benefit from skilled occupational therapy to maximize functional use of left hand in activities of daily living.     INITIAL ASSESSMENT:  Mr. Montoya presents for displaced fracture of L DIP s/p laceration to DIP in work incident. Upon evaluation, pt presents with decreased DIP flexion AROM as well as limited tip/3JC pinch and  strength. At this time, Star Montoya would benefit from skilled OT services to maximize functional use of L hand during ADLs, IADLs, and work tasks.    PROBLEM LIST (Impacting functional limitations):  1. Decreased Strength  2. Decreased ADL/Functional Activities  3. Increased Pain  4. Decreased Activity Tolerance  5. Decreased Flexibility/Joint Mobility  6. Decreased [place] with Home Exercise Program INTERVENTIONS PLANNED: (Treatment may consist of any combination of the following)  1. Activities of daily living training  2. Adaptive equipment training  3. Donning&doffing training  4. Manual therapy  training  5. Modalities  6. Neuromuscular re-eduation  7. Re-evaluation  8. Splinting  9. Therapeutic activity  10. Therapeutic exercise     GOALS: (Goals have been discussed and agreed upon with patient.)  Short-Term Functional Goals: Time Frame: 45 days  1. Patient will be independent with HEP within 2 visits of initial evaluation in order to maintain gains achieved during therapy. Met  Discharge Goals: Time Frame: 90 days  1. Patient will report increased independence with activities of daily living, evidenced by a score of < 30 on the Quick Dash. Unable to assess at time of discharge  2. Patient will demonstrate improved left  strength by 10 lbs to increase independence with completing heavy household chores. Met  3. Patient will improve left DIP flexion AROM by 20 degrees for increased ability to complete feeding tasks. Unable to assess at time of discharge  4. Patient will improve L tip pinch by 5 degrees to for increased ability to open tight or new jars. Met    OUTCOME MEASURE:   Tool Used: Disabilities of the Arm, Shoulder and Hand (DASH) Questionnaire - Quick Version  Score:  Initial: 46/55  Most Recent: X/55 (Date: -- )   Interpretation of Score: The DASH is designed to measure the activities of daily living in person's with upper extremity dysfunction or pain. Each section is scored on a 1-5 scale, 5 representing the greatest disability. The scores of each section are added together for a total score of 55. Thank you for this referral,  Dimitri Alexis OT     Referring Physician Signature: Humaira Carranza MD No Signature is Required for this note.

## 2022-04-22 ENCOUNTER — APPOINTMENT (OUTPATIENT)
Dept: GENERAL RADIOLOGY | Age: 47
End: 2022-04-22
Attending: EMERGENCY MEDICINE
Payer: COMMERCIAL

## 2022-04-22 ENCOUNTER — HOSPITAL ENCOUNTER (EMERGENCY)
Age: 47
Discharge: HOME | End: 2022-04-22
Attending: EMERGENCY MEDICINE
Payer: COMMERCIAL

## 2022-04-22 VITALS
RESPIRATION RATE: 16 BRPM | BODY MASS INDEX: 27.16 KG/M2 | OXYGEN SATURATION: 97 % | HEIGHT: 71 IN | HEART RATE: 69 BPM | SYSTOLIC BLOOD PRESSURE: 147 MMHG | TEMPERATURE: 98.5 F | DIASTOLIC BLOOD PRESSURE: 108 MMHG | WEIGHT: 194 LBS

## 2022-04-22 DIAGNOSIS — R07.9 NONSPECIFIC CHEST PAIN: Primary | ICD-10-CM

## 2022-04-22 LAB
ALBUMIN SERPL-MCNC: 4.5 G/DL (ref 3.5–5)
ALBUMIN/GLOB SERPL: 1.7 {RATIO}
ALP SERPL-CCNC: 81 U/L (ref 45–117)
ALT SERPL-CCNC: 18 U/L (ref 13–61)
ANION GAP SERPL CALC-SCNC: 13 MMOL/L (ref 7–16)
AST SERPL-CCNC: 16 U/L (ref 15–37)
BASOPHILS # BLD: 0 K/UL (ref 0–0.2)
BASOPHILS NFR BLD: 1 % (ref 0–2)
BILIRUB SERPL-MCNC: 0.8 MG/DL (ref 0.2–1.1)
BUN SERPL-MCNC: 13 MG/DL (ref 7–18)
CALCIUM SERPL-MCNC: 9 MG/DL (ref 8.3–10.4)
CHLORIDE SERPL-SCNC: 106 MMOL/L (ref 98–107)
CO2 SERPL-SCNC: 24 MMOL/L (ref 21–32)
CREAT SERPL-MCNC: 0.99 MG/DL (ref 0.8–1.5)
D DIMER PPP FEU-MCNC: 0.4 UG/ML(FEU)
DIFFERENTIAL METHOD BLD: ABNORMAL
EOSINOPHIL # BLD: 0.1 K/UL (ref 0–0.8)
EOSINOPHIL NFR BLD: 2 % (ref 0.5–7.8)
ERYTHROCYTE [DISTWIDTH] IN BLOOD BY AUTOMATED COUNT: 12.6 % (ref 11.9–14.6)
GLOBULIN SER CALC-MCNC: 2.7 G/DL (ref 2.3–3.5)
GLUCOSE SERPL-MCNC: 92 MG/DL (ref 65–100)
HCT VFR BLD AUTO: 48.4 % (ref 41.1–50.3)
HGB BLD-MCNC: 15.5 G/DL (ref 13.6–17.2)
IMM GRANULOCYTES # BLD AUTO: 0 K/UL (ref 0–0.5)
IMM GRANULOCYTES NFR BLD AUTO: 0 % (ref 0–5)
LIPASE SERPL-CCNC: 29 U/L (ref 13–60)
LYMPHOCYTES # BLD: 1.9 K/UL (ref 0.5–4.6)
LYMPHOCYTES NFR BLD: 33 % (ref 13–44)
MAGNESIUM SERPL-MCNC: 2.3 MG/DL (ref 1.2–2.6)
MCH RBC QN AUTO: 28.7 PG (ref 26.1–32.9)
MCHC RBC AUTO-ENTMCNC: 32 G/DL (ref 31.4–35)
MCV RBC AUTO: 89.5 FL (ref 79.6–97.8)
MONOCYTES # BLD: 0.5 K/UL (ref 0.1–1.3)
MONOCYTES NFR BLD: 8 % (ref 4–12)
NEUTS SEG # BLD: 3.2 K/UL (ref 1.7–8.2)
NEUTS SEG NFR BLD: 57 % (ref 43–78)
NRBC # BLD: 0 K/UL (ref 0–0.2)
PLATELET # BLD AUTO: 296 K/UL (ref 150–450)
PMV BLD AUTO: 9 FL (ref 9.4–12.3)
POTASSIUM SERPL-SCNC: 4 MMOL/L (ref 3.5–5.1)
PROT SERPL-MCNC: 7.2 G/DL (ref 6.4–8.2)
RBC # BLD AUTO: 5.41 M/UL (ref 4.23–5.6)
SODIUM SERPL-SCNC: 143 MMOL/L (ref 136–145)
TROPONIN T SERPL HS-MCNC: 6.5 NG/L (ref 0–22)
TROPONIN T SERPL HS-MCNC: <6 NG/L (ref 0–22)
WBC # BLD AUTO: 5.7 K/UL (ref 4.3–11.1)

## 2022-04-22 PROCEDURE — 80053 COMPREHEN METABOLIC PANEL: CPT

## 2022-04-22 PROCEDURE — 83735 ASSAY OF MAGNESIUM: CPT

## 2022-04-22 PROCEDURE — 93005 ELECTROCARDIOGRAM TRACING: CPT | Performed by: EMERGENCY MEDICINE

## 2022-04-22 PROCEDURE — 85025 COMPLETE CBC W/AUTO DIFF WBC: CPT

## 2022-04-22 PROCEDURE — 71046 X-RAY EXAM CHEST 2 VIEWS: CPT

## 2022-04-22 PROCEDURE — 99285 EMERGENCY DEPT VISIT HI MDM: CPT

## 2022-04-22 PROCEDURE — 85379 FIBRIN DEGRADATION QUANT: CPT

## 2022-04-22 PROCEDURE — 84484 ASSAY OF TROPONIN QUANT: CPT

## 2022-04-22 PROCEDURE — 83690 ASSAY OF LIPASE: CPT

## 2022-04-22 RX ORDER — DICLOFENAC SODIUM 75 MG/1
75 TABLET, DELAYED RELEASE ORAL 2 TIMES DAILY
Qty: 20 TABLET | Refills: 0 | Status: SHIPPED | OUTPATIENT
Start: 2022-04-22

## 2022-04-22 RX ORDER — SODIUM CHLORIDE 0.9 % (FLUSH) 0.9 %
5-10 SYRINGE (ML) INJECTION AS NEEDED
Status: DISCONTINUED | OUTPATIENT
Start: 2022-04-22 | End: 2022-04-22 | Stop reason: HOSPADM

## 2022-04-22 RX ORDER — SODIUM CHLORIDE 0.9 % (FLUSH) 0.9 %
5-10 SYRINGE (ML) INJECTION EVERY 8 HOURS
Status: DISCONTINUED | OUTPATIENT
Start: 2022-04-22 | End: 2022-04-22 | Stop reason: HOSPADM

## 2022-04-22 NOTE — ED TRIAGE NOTES
Patient co chest pain and right arm numbness going on for about 2 days.   Patient denies cough or congestion

## 2022-04-22 NOTE — ED PROVIDER NOTES
Presents for left-sided chest pain that is sharp and dull in nature started about 2 days ago. Is been waxing and waning intensity but he feels like it is never gone away completely. It is occasionally woken him from sleep as well. Currently rates it 4/10 in intensity. There is no radiation of the pain per se but patient did report some right sided upper extremity tingling in the hands as well as some reported tingling in the feet. He denies any history of coronary artery disease there is a family history however he has no history of hypertension or diabetes. He does work as a long- but denies any unusual pain or swelling of the lower extremities. He denies any shortness of breath, fever chills or cough. The history is provided by the patient. Chest Pain   This is a new problem. The current episode started 2 days ago. The problem has not changed since onset. The problem occurs constantly. The pain is associated with rest and normal activity. The pain is present in the left side. The pain is at a severity of 4/10. The pain is moderate. The quality of the pain is described as pressure-like and sharp. The pain does not radiate. Exacerbated by: No identified provoking or worsening factors. Pertinent negatives include no abdominal pain, no back pain, no claudication, no cough, no diaphoresis, no dizziness, no exertional chest pressure, no fever, no headaches, no hemoptysis, no irregular heartbeat, no leg pain, no lower extremity edema, no malaise/fatigue, no nausea, no near-syncope, no numbness, no orthopnea, no palpitations, no PND, no shortness of breath, no sputum production, no vomiting and no weakness. He has tried nothing for the symptoms. The treatment provided no relief. Risk factors include family history and male gender. His past medical history does not include DVT. History reviewed. No pertinent past medical history. History reviewed. No pertinent surgical history. History reviewed. No pertinent family history. Social History     Socioeconomic History    Marital status: UNKNOWN     Spouse name: Not on file    Number of children: Not on file    Years of education: Not on file    Highest education level: Not on file   Occupational History    Not on file   Tobacco Use    Smoking status: Never Smoker    Smokeless tobacco: Never Used   Substance and Sexual Activity    Alcohol use: Yes    Drug use: Not on file    Sexual activity: Not on file   Other Topics Concern    Not on file   Social History Narrative    Not on file     Social Determinants of Health     Financial Resource Strain:     Difficulty of Paying Living Expenses: Not on file   Food Insecurity:     Worried About Running Out of Food in the Last Year: Not on file    Rick of Food in the Last Year: Not on file   Transportation Needs:     Lack of Transportation (Medical): Not on file    Lack of Transportation (Non-Medical): Not on file   Physical Activity:     Days of Exercise per Week: Not on file    Minutes of Exercise per Session: Not on file   Stress:     Feeling of Stress : Not on file   Social Connections:     Frequency of Communication with Friends and Family: Not on file    Frequency of Social Gatherings with Friends and Family: Not on file    Attends [employer] Services: Not on file    Active Member of [address] [place] [address] [employer] or Organizations: Not on file    Attends Club or Organization Meetings: Not on file    Marital Status: Not on file   Intimate Partner Violence:     Fear of Current or Ex-Partner: Not on file    Emotionally Abused: Not on file    Physically Abused: Not on file    Sexually Abused: Not on file   Housing Stability:     Unable to Pay for Housing in the Last Year: Not on file    Number of [place] in the Last Year: Not on file    Unstable Housing in the Last Year: Not on file         ALLERGIES: Patient has no known allergies.     Review of Systems   Constitutional: Negative for diaphoresis, fever and malaise/fatigue. Respiratory: Negative for cough, hemoptysis, sputum production and shortness of breath. Cardiovascular: Positive for chest pain. Negative for palpitations, orthopnea, claudication, PND and near-syncope. Gastrointestinal: Negative for abdominal pain, nausea and vomiting. Musculoskeletal: Negative for back pain. Neurological: Negative for dizziness, weakness, numbness and headaches. All other systems reviewed and are negative. Vitals:    04/22/22 1128   BP: (!) 147/108   Pulse: 69   Resp: 16   Temp: 98.5 °F (36.9 °C)   SpO2: 97%   Weight: 88 kg (194 lb)   Height: 5' 11\" (1.803 m)            Physical Exam  Vitals and nursing note reviewed. Constitutional:       General: He is not in acute distress. Appearance: Normal appearance. He is normal weight. He is not ill-appearing, toxic-appearing or diaphoretic. HENT:      Head: Normocephalic and atraumatic. Mouth/Throat:      Mouth: Mucous membranes are moist.      Pharynx: Oropharynx is clear. Eyes:      Extraocular Movements: Extraocular movements intact. Conjunctiva/sclera: Conjunctivae normal.      Pupils: Pupils are equal, round, and reactive to light. Cardiovascular:      Rate and Rhythm: Normal rate and regular rhythm. Pulses: Normal pulses. Heart sounds: Normal heart sounds. Pulmonary:      Effort: Pulmonary effort is normal.      Breath sounds: Normal breath sounds. Abdominal:      General: Bowel sounds are normal. There is no distension. Palpations: Abdomen is soft. Tenderness: There is no abdominal tenderness. There is no right CVA tenderness, left CVA tenderness or guarding. Musculoskeletal:         General: Normal range of motion. Cervical back: Normal range of motion and neck supple. Skin:     General: Skin is warm and dry. Capillary Refill: Capillary refill takes less than 2 seconds. Neurological:      General: No focal deficit present. Mental Status: He is alert and oriented to person, place, and time. Mental status is at baseline. Psychiatric:         Mood and Affect: Mood normal.         Behavior: Behavior normal.         Thought Content: Thought content normal.          MDM  Number of Diagnoses or Management Options  Diagnosis management comments: With normal EKG and 2 days of symptoms ischemic etiology is believed unlikely. Troponin pending. Other possible etiologies to include venous thromboembolic disease. No evidence of ST changes to suggest myocarditis or pericarditis.        Amount and/or Complexity of Data Reviewed  Clinical lab tests: ordered and reviewed  Independent visualization of images, tracings, or specimens: yes (EKG at 1131: Is a sinus rhythm, rate of 63, no acute ischemic changes.)    Risk of Complications, Morbidity, and/or Mortality  Presenting problems: moderate  Diagnostic procedures: moderate  Management options: moderate    Patient Progress  Patient progress: stable         Procedures

## 2022-04-22 NOTE — ED NOTES
I have reviewed discharge instructions with the patient. The patient verbalized understanding. Patient left ED via Discharge Method: ambulatory to Home with self. Opportunity for questions and clarification provided. Patient given 1 scripts. To continue your aftercare when you leave the hospital, you may receive an automated call from our care team to check in on how you are doing. This is a free service and part of our promise to provide the best care and service to meet your aftercare needs.  If you have questions, or wish to unsubscribe from this service please call [phone]. Thank you for Choosing our [address] [place] [address] Emergency Department.

## 2022-11-06 ENCOUNTER — HOSPITAL ENCOUNTER (EMERGENCY)
Age: 47
Discharge: LEFT BEFORE DISCHARGE | End: 2022-11-06
Attending: EMERGENCY MEDICINE

## 2022-11-06 VITALS
HEART RATE: 63 BPM | OXYGEN SATURATION: 97 % | DIASTOLIC BLOOD PRESSURE: 105 MMHG | BODY MASS INDEX: 26.6 KG/M2 | SYSTOLIC BLOOD PRESSURE: 147 MMHG | WEIGHT: 190 LBS | RESPIRATION RATE: 16 BRPM | HEIGHT: 71 IN | TEMPERATURE: 99 F

## 2022-11-06 RX ORDER — TESTOSTERONE 20.25 MG/1.25G
GEL TOPICAL
COMMUNITY

## 2022-11-06 RX ORDER — FLUOXETINE 10 MG/1
10 CAPSULE ORAL DAILY
COMMUNITY

## 2022-11-06 ASSESSMENT — PAIN - FUNCTIONAL ASSESSMENT: PAIN_FUNCTIONAL_ASSESSMENT: NONE - DENIES PAIN
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
## 2022-11-07 NOTE — ED NOTES
This nurse went to go meet pt. Pt not in room. Pt left without being seen.       Myles Mckinney RN  11/06/22 [id number]

## 2022-11-07 NOTE — ED NOTES
Pt presents to the ED for c/o depression.   Verbalizes concerns for his family especially his wife who needs chemo     Meghan Russo RN  11/06/22 [id number]